# Patient Record
Sex: FEMALE | Race: ASIAN | NOT HISPANIC OR LATINO | Employment: FULL TIME | URBAN - METROPOLITAN AREA
[De-identification: names, ages, dates, MRNs, and addresses within clinical notes are randomized per-mention and may not be internally consistent; named-entity substitution may affect disease eponyms.]

---

## 2017-01-10 ENCOUNTER — TELEPHONE (OUTPATIENT)
Dept: FAMILY MEDICINE | Facility: CLINIC | Age: 47
End: 2017-01-10

## 2017-01-11 ENCOUNTER — OFFICE VISIT (OUTPATIENT)
Dept: FAMILY MEDICINE | Facility: CLINIC | Age: 47
End: 2017-01-11
Payer: COMMERCIAL

## 2017-01-11 VITALS
DIASTOLIC BLOOD PRESSURE: 80 MMHG | RESPIRATION RATE: 16 BRPM | BODY MASS INDEX: 20.66 KG/M2 | HEART RATE: 93 BPM | HEIGHT: 65 IN | TEMPERATURE: 99.5 F | SYSTOLIC BLOOD PRESSURE: 120 MMHG | WEIGHT: 124 LBS | OXYGEN SATURATION: 98 %

## 2017-01-11 DIAGNOSIS — N39.46 MIXED INCONTINENCE: ICD-10-CM

## 2017-01-11 DIAGNOSIS — H65.03 BILATERAL ACUTE SEROUS OTITIS MEDIA, RECURRENCE NOT SPECIFIED: ICD-10-CM

## 2017-01-11 DIAGNOSIS — N30.01 ACUTE CYSTITIS WITH HEMATURIA: Primary | ICD-10-CM

## 2017-01-11 DIAGNOSIS — R30.0 DYSURIA: ICD-10-CM

## 2017-01-11 LAB
ALBUMIN UR-MCNC: ABNORMAL MG/DL
AMORPH CRY #/AREA URNS HPF: ABNORMAL /HPF
APPEARANCE UR: ABNORMAL
BACTERIA #/AREA URNS HPF: ABNORMAL /HPF
BILIRUB UR QL STRIP: NEGATIVE
COLOR UR AUTO: YELLOW
GLUCOSE UR STRIP-MCNC: NEGATIVE MG/DL
HGB UR QL STRIP: ABNORMAL
KETONES UR STRIP-MCNC: NEGATIVE MG/DL
LEUKOCYTE ESTERASE UR QL STRIP: ABNORMAL
NITRATE UR QL: NEGATIVE
NON-SQ EPI CELLS #/AREA URNS LPF: ABNORMAL /LPF
PH UR STRIP: 6 PH (ref 5–7)
RBC #/AREA URNS AUTO: ABNORMAL /HPF (ref 0–2)
SP GR UR STRIP: 1.02 (ref 1–1.03)
URN SPEC COLLECT METH UR: ABNORMAL
UROBILINOGEN UR STRIP-ACNC: 0.2 EU/DL (ref 0.2–1)
WBC #/AREA URNS AUTO: ABNORMAL /HPF (ref 0–2)

## 2017-01-11 PROCEDURE — 81001 URINALYSIS AUTO W/SCOPE: CPT | Performed by: PHYSICIAN ASSISTANT

## 2017-01-11 PROCEDURE — 87086 URINE CULTURE/COLONY COUNT: CPT | Performed by: PHYSICIAN ASSISTANT

## 2017-01-11 PROCEDURE — 87186 SC STD MICRODIL/AGAR DIL: CPT | Performed by: PHYSICIAN ASSISTANT

## 2017-01-11 PROCEDURE — 87088 URINE BACTERIA CULTURE: CPT | Performed by: PHYSICIAN ASSISTANT

## 2017-01-11 PROCEDURE — 99214 OFFICE O/P EST MOD 30 MIN: CPT | Performed by: PHYSICIAN ASSISTANT

## 2017-01-11 RX ORDER — CIPROFLOXACIN 500 MG/1
500 TABLET, FILM COATED ORAL 2 TIMES DAILY
Qty: 14 TABLET | Refills: 0 | Status: SHIPPED | OUTPATIENT
Start: 2017-01-11 | End: 2017-01-24

## 2017-01-11 NOTE — Clinical Note
[facility]  [address]  [facility] [address]  [phone]                                                                                                           Rajwinder Lagos  [address]  [facility] [address]    January 16, 2017      Dear Rajwinder,    The results of your recent tests were reviewed and are enclosed.     -Urine culture is abnormal and grew out bacteria that are sensitive to the antibiotic you have been given.  Complete the medication as prescribed and if you experience new, worsening or persistent symptoms, you should call or return for a recheck.      Thank you for choosing [facility].  We appreciate the opportunity to serve you and look forward to supporting your healthcare needs in the future.    If you have any questions or concerns, please call me or my staff at [phone].      Sincerely,        Gale Jones PA-C

## 2017-01-11 NOTE — MR AVS SNAPSHOT
After Visit Summary   1/11/2017    Rajwinder Lagos    MRN: [id number]           Patient Information     Date Of Birth          1970        Visit Information        Provider Department      1/11/2017 1:10 PM Gale Jones PA-C [facility]        Today's Diagnoses     Acute cystitis with hematuria    -  1     Bilateral acute serous otitis media, recurrence not specified         Mixed incontinence         Dysuria           Care Instructions      Start antibiotic today.    Increase fluids to help flush urinary tract.      Take ibuprofen (600mg every 6 hours with food or water) or tylenol (500mg every 6 hours) for pain.     OTC urinary pain reliever Azo/Uristat can be used to prevent discomfort. It changes the color of the urine (usually to orange or red) and may stain fabric. Do not use for longer than 48 hours since symptoms should have cleared by this time once antibiotics have been started.  If you are still having severe symptoms, you need to follow up with the clinic.    When culture results return, we will call and change your medication if needed.  Follow up immediately if you start to have high fever, severe back pain, nausea or vomiting.  To prevent future infections:  Drink plenty of water.   Do not delay urinating when you feel the need to urinate.   Keep the vaginal area clean. Wipe from front to back after using the toilet. Be sure to gently wash the genital area each time you bathe or shower. However, use soap only on the outside of your vagina. The chemicals in soap may cause additional irritation.   Urinate after intercourse. Never combine anal and vaginal intercourse.   Wear cotton underwear, which allows better air circulation than nylon. Wear pantyhose with cotton crotches.   Avoid tight clothes in the genital area, such as control-top pantyhose and tight jeans. Do not wear a wet bathing suit for long periods of time.     Middle  Ear Pain/ Middle Ear Fluid (Serous Otitis Media)  Home Treatments:    Apply warm wash cloth or a heating pad to the each ear for 10-15 minutes at a time, 3-4 times daily to encourage fluid drainage.    Use decongestants:    pseudoephedrine (Sudafed)- 60 mg every 4-6 hours for up to 2 weeks. Avoid using before bedtime as it may keep you awake.  May cause an increase in blood pressure.    Afrin nasal spray- DO NOT use for longer than 3 days.  This will cause rebound congestion and worsening of symptoms.    Fluticasone (Flonase) nasal spray- now OTC medication that helps with chronic congestion.  Must be used daily and may take up to 2 weeks before improvement is noted.    Nasacort nasal spray-  OTC medication that helps with chronic congestion.  Must be used daily and may take up to 2 weeks before improvement is noted.    Do not try and pop your ears and be careful blowing your nose so you do not put a hole in your ear drum.    Stay well hydrated to thin mucus.    Take ibuprofen (600mg every 6 hours with food or water) or tylenol (500mg every 6 hours) for pain.  Normally, the eustachian tube helps keep the air pressure inside the middle ear the same as the air pressure outside the middle ear. If there is a problem with the eustachian tube, the air pressure inside the middle ear won t be the same as the air pressure outside it. This can cause ear pain, hearing loss, and other symptoms. Most eustachian tube problems are not serious. They usually last only a short time and get better on their own.  Common causes of eustachian tube problems are:  Illnesses or conditions that make the eustachian tubes swollen or inflamed - These include colds, allergies, ear infections, or sinus infections.   Sudden air pressure changes - Sudden air pressure changes can happen when people fly in an airplane, scuba dive, or drive in the mountains.   Growths that block the eustachian tube   Common symptoms of a eustachian tube problem  include:  Ear pain   Feeling pressure or fullness in the ear   Trouble hearing   Ringing in the ear   Feeling dizzy    Treatment depends on what s causing the eustachian tube problem. Depending on your individual situation, your medical provider might treat you with 1 or more of the following:  Nose sprays   Antihistamines - These medicines are usually used to treat allergies. They help stop itching, sneezing, and runny nose symptoms. If your symptoms were triggered by allergies, taking an antihistamine can help stop these symptoms.  Decongestants - These medicines can help with stuffy nose symptoms.   Antibiotic medicines - If you have an infection caused by bacteria, your doctor can treat it with antibiotics.   Call your healthcare provider if you have:    a temperature of 101.5 degrees F (38.6 degrees C) or higher that persists even after you take acetaminophen, aspirin, or ibuprofen    a severe headache or worsening pain around the ear    swelling around the ear    increasing dizziness    worsening of hearing problems    weakness of one side of your face.        Follow-ups after your visit        Additional Services     UROLOGY ADULT REFERRAL       Your provider has referred you to: JO[facility]: [facility] [phone]   [email or web address]    Please be aware that coverage of these services is subject to the terms and limitations of your health insurance plan.  Call member services at your health plan with any benefit or coverage questions.      Please bring the following with you to your appointment:    (1) Any X-Rays, CTs or MRIs which have been performed.  Contact the facility where they were done to arrange for  prior to your scheduled appointment.    (2) List of current medications  (3) This referral request   (4) Any documents/labs given to you for this referral                  Who to contact     If you have questions or need follow up information about today's  "clinic visit or your schedule please contact [facility] directly at [phone].  Normal or non-critical lab and imaging results will be communicated to you by [employer], letter or phone within 4 business days after the clinic has received the results. If you do not hear from us within 7 days, please contact the clinic through [employer] or phone. If you have a critical or abnormal lab result, we will notify you by phone as soon as possible.  Submit refill requests through [employer] or call your pharmacy and they will forward the refill request to us. Please allow 3 business days for your refill to be completed.          Additional Information About Your Visit        [employer] Information     [employer] lets you send messages to your doctor, view your test results, renew your prescriptions, schedule appointments and more. To sign up, go to [email or web address] . Click on \"Log in\" on the left side of the screen, which will take you to the Welcome page. Then click on \"Sign up Now\" on the right side of the page.     You will be asked to enter the access code listed below, as well as some personal information. Please follow the directions to create your username and password.     Your access code is: [email or web address]  Expires: 2017  2:54 PM     Your access code will  in 90 days. If you need help or a new code, please call your [facility] clinic or [phone].        Care EveryWhere ID     This is your Care EveryWhere ID. This could be used by other organizations to access your [facility] medical records  [id number]        Your Vitals Were     Pulse Temperature Respirations Height BMI (Body Mass Index) Pulse Oximetry    93 99.5  F (37.5  C) 16 5' 5.25\" (1.657 m) 20.49 kg/m2 98%       Blood Pressure from Last 3 Encounters:   17 120/80   16 106/64   10/26/16 138/83    Weight from Last 3 Encounters:   17 124 lb (56.246 kg)   16 126 lb 12.8 oz (57.516 kg)   10/26/16 120 lb " (54.432 kg)              We Performed the Following     UA reflex to Microscopic and Culture     Urine Culture Aerobic Bacterial     Urine Microscopic     UROLOGY ADULT REFERRAL          Today's Medication Changes          These changes are accurate as of: 1/11/17  2:54 PM.  If you have any questions, ask your nurse or doctor.               Start taking these medicines.        Dose/Directions    ciprofloxacin 500 MG tablet   Commonly known as:  CIPRO   Used for:  Acute cystitis with hematuria   Started by:  Gale Jones PA-C        Dose:  500 mg   Take 1 tablet (500 mg) by mouth 2 times daily   Quantity:  14 tablet   Refills:  0         Stop taking these medicines if you haven't already. Please contact your care team if you have questions.     nitrofurantoin (macrocrystal-monohydrate) 100 MG capsule   Commonly known as:  MACROBID   Stopped by:  Gale Jones PA-C                Where to get your medicines      These medications were sent to [facility] [address]  [address], [place] [address], [facility] [address]     Phone:  [phone]    - ciprofloxacin 500 MG tablet             Primary Care Provider    Physician No Ref-Primary       No address on file        Thank you!     Thank you for choosing [facility]  for your care. Our goal is always to provide you with excellent care. Hearing back from our patients is one way we can continue to improve our services. Please take a few minutes to complete the written survey that you may receive in the mail after your visit with us. Thank you!             Your Updated Medication List - Protect others around you: Learn how to safely use, store and throw away your medicines at www.disposemymeds.org.          This list is accurate as of: 1/11/17  2:54 PM.  Always use your most recent med list.                   Brand Name Dispense Instructions for use     ciprofloxacin 500 MG tablet    CIPRO    14 tablet    Take 1 tablet (500 mg) by mouth 2 times daily

## 2017-01-11 NOTE — PATIENT INSTRUCTIONS
Start antibiotic today.    Increase fluids to help flush urinary tract.      Take ibuprofen (600mg every 6 hours with food or water) or tylenol (500mg every 6 hours) for pain.     OTC urinary pain reliever Azo/Uristat can be used to prevent discomfort. It changes the color of the urine (usually to orange or red) and may stain fabric. Do not use for longer than 48 hours since symptoms should have cleared by this time once antibiotics have been started.  If you are still having severe symptoms, you need to follow up with the clinic.    When culture results return, we will call and change your medication if needed.  Follow up immediately if you start to have high fever, severe back pain, nausea or vomiting.  To prevent future infections:  Drink plenty of water.   Do not delay urinating when you feel the need to urinate.   Keep the vaginal area clean. Wipe from front to back after using the toilet. Be sure to gently wash the genital area each time you bathe or shower. However, use soap only on the outside of your vagina. The chemicals in soap may cause additional irritation.   Urinate after intercourse. Never combine anal and vaginal intercourse.   Wear cotton underwear, which allows better air circulation than nylon. Wear pantyhose with cotton crotches.   Avoid tight clothes in the genital area, such as control-top pantyhose and tight jeans. Do not wear a wet bathing suit for long periods of time.     Middle Ear Pain/ Middle Ear Fluid (Serous Otitis Media)  Home Treatments:    Apply warm wash cloth or a heating pad to the each ear for 10-15 minutes at a time, 3-4 times daily to encourage fluid drainage.    Use decongestants:    pseudoephedrine (Sudafed)- 60 mg every 4-6 hours for up to 2 weeks. Avoid using before bedtime as it may keep you awake.  May cause an increase in blood pressure.    Afrin nasal spray- DO NOT use for longer than 3 days.  This will cause rebound congestion and worsening of  symptoms.    Fluticasone (Flonase) nasal spray- now OTC medication that helps with chronic congestion.  Must be used daily and may take up to 2 weeks before improvement is noted.    Nasacort nasal spray-  OTC medication that helps with chronic congestion.  Must be used daily and may take up to 2 weeks before improvement is noted.    Do not try and pop your ears and be careful blowing your nose so you do not put a hole in your ear drum.    Stay well hydrated to thin mucus.    Take ibuprofen (600mg every 6 hours with food or water) or tylenol (500mg every 6 hours) for pain.  Normally, the eustachian tube helps keep the air pressure inside the middle ear the same as the air pressure outside the middle ear. If there is a problem with the eustachian tube, the air pressure inside the middle ear won t be the same as the air pressure outside it. This can cause ear pain, hearing loss, and other symptoms. Most eustachian tube problems are not serious. They usually last only a short time and get better on their own.  Common causes of eustachian tube problems are:  Illnesses or conditions that make the eustachian tubes swollen or inflamed   These include colds, allergies, ear infections, or sinus infections.   Sudden air pressure changes   Sudden air pressure changes can happen when people fly in an airplane, scuba dive, or drive in the mountains.   Growths that block the eustachian tube   Common symptoms of a eustachian tube problem include:  Ear pain   Feeling pressure or fullness in the ear   Trouble hearing   Ringing in the ear   Feeling dizzy    Treatment depends on what s causing the eustachian tube problem. Depending on your individual situation, your medical provider might treat you with 1 or more of the following:  Nose sprays   Antihistamines   These medicines are usually used to treat allergies. They help stop itching, sneezing, and runny nose symptoms. If your symptoms were triggered by allergies, taking an  antihistamine can help stop these symptoms.  Decongestants   These medicines can help with stuffy nose symptoms.   Antibiotic medicines   If you have an infection caused by bacteria, your doctor can treat it with antibiotics.   Call your healthcare provider if you have:    a temperature of 101.5 degrees F (38.6 degrees C) or higher that persists even after you take acetaminophen, aspirin, or ibuprofen    a severe headache or worsening pain around the ear    swelling around the ear    increasing dizziness    worsening of hearing problems    weakness of one side of your face.

## 2017-01-11 NOTE — PROGRESS NOTES
SUBJECTIVE:                                                    Rajwinder Lagos is a 46 year old female who presents to clinic today for the following health issues:    Health Maintenance Due   Topic Date Due     LIPID SCREEN Q5 YR FEMALE (SYSTEM ASSIGNED)  12/20/2016     Health Maintenance reviewed at today's visit patient asked to schedule/complete: lipids    URINARY TRACT SYMPTOMS    Duration: 3-4 days    Description dysuria, frequency and chills and body aches    Intensity:  moderate    Accompanying signs and symptoms:  Fever/chills: YES  Flank pain no   Nausea and vomiting: YES- nausea, no emesis  Vaginal symptoms: none  Abdominal/Pelvic Pain: YES- low back pain    History  History of frequent UTI's: no but did have one in October.    History of kidney stones: no   Sexually Active: YES  Possibility of pregnancy: No    Precipitating or alleviating factors: None    Therapies tried and outcome: heat for low back, ibuprofen for headache and body aches with some improvement.       Additional complaints: None    HPI additional notes: Rajwinder presents today with   Chief Complaint   Patient presents with     Chills     Dysuria   History of UTI in Oct, resistant to bactrim, treated with macrobid.  Symptoms had been less painful with urination and then started having body aches, chills over the last couple days and painful headache and neck pain.  Lower back pain started a few days earlier, more on the right side.  Has been getting worse over the last few days.  Heat helps with this pain but it returns when heat is stopping.      CR     0.72   11/12/2014    Also has some left ear pressure and swollen glands in the neck.       ROS:  C: Negative for fever, chills, recent change in weight  ENT: Positive for ear pain and swollen glands.  CV: Negative for chest pain or peripheral edema  GI: Negative for nausea, abdominal pain, heartburn, or change in bowel habits  : POSITIVE for frequency, urgency, and dysuria.  MS:  "Positive for flank pain  P: Negative for changes in mood or affect  ROS otherwise negative.    Chart Review:  History   Smoking status     Never Smoker    Smokeless tobacco     Never Used     Patient Active Problem List   Diagnosis     Fibrous lesion  dysplasia (monostotic), unspecified forearm-Lt nondominant      History reviewed. No pertinent past surgical history.  Problem list, Medication list, Allergies, Medical/Social/Surg hx reviewed in [facility], updated as appropriate.   OBJECTIVE:                                                    /80 mmHg  Pulse 93  Temp(Src) 99.5  F (37.5  C)  Resp 16  Ht 5' 5.25\" (1.657 m)  Wt 124 lb (56.246 kg)  BMI 20.49 kg/m2  SpO2 98%  Body mass index is 20.49 kg/(m^2).  GENERAL: healthy, alert, in no acute distress  HENT: Ear canals normal bilateral. TM dull gray  bulging with serous effusion bilateral.  Nasal mucosa mildly edematous with clear rhinorrhea.  Mouth- mucous membranes moist, no lesions or ulcerations. Pharynx pink. Pharynx pink. and No tonsillary hypertrophy. Uvula midline, no  post-nasal drainage.  Maxillary and frontal sinuses tender to palpation bilaterally.  NECK: Non-tender, no adenopathy.  RESP: lungs clear to auscultation - no rales, no rhonchi, no wheezes  CV: regular rate and rhythm, normal S1 S2.  No peripheral edema.  MS: no gross deformities noted.  Mild left sided CVA tenderness. No paralumbar tenderness.  SKIN: no suspicious lesions, no rashes  PSYCH: Alert and oriented times 3;  Able to articulate logical thoughts. Affect is normal.    Diagnostic test results:   Results for orders placed or performed in visit on 01/11/17 (from the past 24 hour(s))   UA reflex to Microscopic and Culture   Result Value Ref Range    Color Urine Yellow     Appearance Urine Slightly Cloudy     Glucose Urine Negative NEG mg/dL    Bilirubin Urine Negative NEG    Ketones Urine Negative NEG mg/dL    Specific Gravity Urine 1.020 1.003 - 1.035    Blood Urine Small (A) NEG "    pH Urine 6.0 5.0 - 7.0 pH    Protein Albumin Urine Trace (A) NEG mg/dL    Urobilinogen Urine 0.2 0.2 - 1.0 EU/dL    Nitrite Urine Negative NEG    Leukocyte Esterase Urine Large (A) NEG    Source Midstream Urine    Urine Microscopic   Result Value Ref Range    WBC Urine  (A) 0 - 2 /HPF    RBC Urine 2-5 (A) 0 - 2 /HPF    Squamous Epithelial /LPF Urine Many (A) FEW /LPF    Bacteria Urine Few (A) NEG /HPF    Amorphous Crystals Moderate (A) NEG /HPF        ASSESSMENT/PLAN:                                                        ICD-10-CM    1. Acute cystitis with hematuria N30.01 Urine Microscopic     ciprofloxacin (CIPRO) 500 MG tablet   2. Dysuria R30.0 UA reflex to Microscopic and Culture   3. Bilateral acute serous otitis media, recurrence not specified H65.03      Will call if urine culture recommends antibiotic change.  Okay to leave voicemail on mobile phone.    Discussed OTC medications for headache, congestion and serous otitis media.  I do not believe they are related to her bladder symptoms.      Pt also discussed increased urinary frequency with incontinence which has been worsening since the birth of her last child 7 years ago.  Has not been evaluated for this. Needs to urinate about every 30 minutes during the day.  Urology referral provided for follow up.      Please see patient instructions for treatment details.    Follow up in 7-10 days if not improving as anticipated.    Gale Jones PA-C  [facility]

## 2017-01-11 NOTE — NURSING NOTE
"Chief Complaint   Patient presents with     Chills     Dysuria       Initial /80 mmHg  Pulse 93  Temp(Src) 99.5  F (37.5  C)  Resp 16  Ht 5' 5.25\" (1.657 m)  Wt 124 lb (56.246 kg)  BMI 20.49 kg/m2  SpO2 98% Estimated body mass index is 20.49 kg/(m^2) as calculated from the following:    Height as of this encounter: 5' 5.25\" (1.657 m).    Weight as of this encounter: 124 lb (56.246 kg).  BP completed using cuff size: regular  S Luis Alfredo CMA      "

## 2017-01-13 LAB
BACTERIA SPEC CULT: ABNORMAL
MICRO REPORT STATUS: ABNORMAL
MICROORGANISM SPEC CULT: ABNORMAL
SPECIMEN SOURCE: ABNORMAL

## 2017-01-14 ENCOUNTER — TELEPHONE (OUTPATIENT)
Dept: NURSING | Facility: CLINIC | Age: 47
End: 2017-01-14

## 2017-01-14 NOTE — TELEPHONE ENCOUNTER
Call Type: Triage Call    Presenting Problem: Caller states that she in taking an antibiotic  for a bladder infection. She says that she also is having some sinus  pressure and headache. She says provider   told her to take daquil  and she has, but doesn't think that it is helping, Per EPIc advised  can also try nasal spray and staying hydrated.  Advised to call  provider if not fe eling better by next week.  Triage Note:  Guideline Title: Face Pain or Swelling  Recommended Disposition: Provide Home/Self Care  Original Inclination: Wanted to speak with a nurse  Override Disposition:  Intended Action: Follow advice given  Physician Contacted: No  Pressure/pain above or below eyes, near ears over sinuses (may also be described  as fullness, worsens when bending forward, teeth or eye pain) ?  YES  Painful OR itchy rash on face ? NO  Severe breathing problems ? NO  Known herpes zoster or undiagnosed blister-like rash on or near eye or on tip of  nose ? NO  Toothache/tooth pain (not caused by injury) with some localized swelling of face ?  NO  Outbreak of pimples/papules, pustules or cysts ? NO  Rapid onset of generalized swelling of face or neck (other than glands or lymph  nodes) ? NO  Severe pain that continues; interferes with ability to carry out usual activities  or sleep AND is not responding to 24 hours of home care ? NO  Signs/symptoms of anaphylaxis ? NO  Blisters or sores on or near eye(s) ? NO  Unable to open or close mouth fully ? NO  Tissue surrounding eye is red, swollen and tender ? NO  Worsening redness, swelling AND tenderness of tissue around eyes associated with  restricted or painful eye movements, bulging eye, or decreased vision related to  swelling ? NO  Pain in ear or in jaw joint (TMJ) with movement, may also hear clicking, popping  or grating sound; temple area may be tender to touch ? NO  Unexplained localized swelling (not related to trauma, bites/stings, or known  allergy) AND without any  symptoms of anaphylaxis ? NO  Any other cardiac signs/symptoms for more than 5 minutes, now or within last hour.  Pain is NOT associated with taking a deep breath or a productive cough, movement,  or touch to a localized area on the chest or upper body. ? NO  New tenderness over temporal area in individuals age 40 years or older ? NO  Being treated by a provider for a secondary infection AND no improvement in  symptoms, symptoms have worsened OR has new symptoms after following treatment  plan for the time specified by provider. ? NO  Pregnant person with temperature of 100 F (37.7 C) or greater OR any temperature  elevation for 3 days. ? NO  Eye pain or vision change ? NO  Any temperature elevation in an immunocompromised individual OR frail elderly with  signs of dehydration ? NO  Physician Instructions:  Care Advice: Use a cool mist humidifier to moisten air.  Be sure to clean  according to 's instructions.  Consider use of a saline nasal spray per package directions to help relieve  nasal congestion.  Consider nonprescription decongestant (Sudafed, Drixoral) for relief of  symptoms after checking with a provider, especially if there is a history  of hypertension, hyperthyroidism, heart disease, diabetes, glaucoma,  urinary retention caused by prostatic hypertrophy.  Drink more fluids when not feeling well unless you have been told to limit  fluid intake because of a health condition. The body's total water needs  are met through drinking water and other beverages and the foods we eat.  Urine will be very light yellow color when your body is well hydrated.  Apply local moist heat (such as a warm, wet wash cloth or small towel  covered with plastic wrap) to the area for 15-20 minutes every 2-3 hours  while awake.  Analgesic/Antipyretic Advice - Acetaminophen: Consider acetaminophen as  directed on label or by pharmacist/provider for pain or fever. PRECAUTIONS:  - Use if there is no history of liver  disease, alcoholism, or intake of  three or more alcohol drinks per day - Only if approved by provider during  pregnancy or when breastfeeding - Do not exceed recommended dose or  frequency. Do not take more than 3000 milligrams (mg) in 24 hours. Do not  take this medicine for more than 10 days unless recommended by your  provider. - During pregnancy, acetaminophen should not be taken more than 3  consecutive days without telling provider - To make sure you don't take too  much, check other medicines you take to see if they also contain  acetaminophen.  See a provider within 72 hours if your symptoms continue for 10 days or  more.  See a provider within 8 hours if you develop a temperature of 101.5 F (38.6  C) or higher  See a provider within 8 hours if you develop redness, swelling and  tenderness above or below your eyes, near ears, or over sinuses

## 2017-01-23 ENCOUNTER — TELEPHONE (OUTPATIENT)
Dept: FAMILY MEDICINE | Facility: CLINIC | Age: 47
End: 2017-01-23

## 2017-01-23 NOTE — TELEPHONE ENCOUNTER
Reason for Call:  Other received a letter from Kylie and has a question about her lab results    Detailed comments: none    Phone Number Patient can be reached at: Home number on file [phone] (home)    Best Time: any     Can we leave a detailed message on this number? YES    Call taken on 1/23/2017 at 10:16 AM by RAFIA YANG

## 2017-01-23 NOTE — TELEPHONE ENCOUNTER
Patient was called, completed CIPRO, all symptoms went away except a 5-6/10 cramp in the LLQ - one side only (not as severe at menstruation).   She has an appt with OB on February 21st for a physical. This pain has been more often in the last 3 days.   Does she need to follow-up with Karen Espinal too?   Will route to Providers team 1

## 2017-01-24 ENCOUNTER — OFFICE VISIT (OUTPATIENT)
Dept: FAMILY MEDICINE | Facility: CLINIC | Age: 47
End: 2017-01-24
Payer: COMMERCIAL

## 2017-01-24 ENCOUNTER — RADIANT APPOINTMENT (OUTPATIENT)
Dept: GENERAL RADIOLOGY | Facility: CLINIC | Age: 47
End: 2017-01-24
Attending: FAMILY MEDICINE
Payer: COMMERCIAL

## 2017-01-24 VITALS
RESPIRATION RATE: 18 BRPM | SYSTOLIC BLOOD PRESSURE: 114 MMHG | HEART RATE: 70 BPM | WEIGHT: 125 LBS | DIASTOLIC BLOOD PRESSURE: 70 MMHG | HEIGHT: 66 IN | BODY MASS INDEX: 20.09 KG/M2 | TEMPERATURE: 98.6 F

## 2017-01-24 DIAGNOSIS — R10.32 LLQ ABDOMINAL PAIN: ICD-10-CM

## 2017-01-24 DIAGNOSIS — R10.32 LLQ ABDOMINAL PAIN: Primary | ICD-10-CM

## 2017-01-24 DIAGNOSIS — K59.00 CONSTIPATION, UNSPECIFIED CONSTIPATION TYPE: ICD-10-CM

## 2017-01-24 LAB
ALBUMIN UR-MCNC: NEGATIVE MG/DL
APPEARANCE UR: CLEAR
BASOPHILS # BLD AUTO: 0 10E9/L (ref 0–0.2)
BASOPHILS NFR BLD AUTO: 0.2 %
BILIRUB UR QL STRIP: NEGATIVE
COLOR UR AUTO: YELLOW
DIFFERENTIAL METHOD BLD: ABNORMAL
EOSINOPHIL # BLD AUTO: 0 10E9/L (ref 0–0.7)
EOSINOPHIL NFR BLD AUTO: 0.7 %
ERYTHROCYTE [DISTWIDTH] IN BLOOD BY AUTOMATED COUNT: 12.8 % (ref 10–15)
GLUCOSE UR STRIP-MCNC: NEGATIVE MG/DL
HCT VFR BLD AUTO: 35 % (ref 35–47)
HGB BLD-MCNC: 11.6 G/DL (ref 11.7–15.7)
HGB UR QL STRIP: NEGATIVE
KETONES UR STRIP-MCNC: NEGATIVE MG/DL
LEUKOCYTE ESTERASE UR QL STRIP: NEGATIVE
LYMPHOCYTES # BLD AUTO: 1.5 10E9/L (ref 0.8–5.3)
LYMPHOCYTES NFR BLD AUTO: 25.8 %
MCH RBC QN AUTO: 28.6 PG (ref 26.5–33)
MCHC RBC AUTO-ENTMCNC: 33.1 G/DL (ref 31.5–36.5)
MCV RBC AUTO: 86 FL (ref 78–100)
MONOCYTES # BLD AUTO: 0.4 10E9/L (ref 0–1.3)
MONOCYTES NFR BLD AUTO: 7.8 %
NEUTROPHILS # BLD AUTO: 3.7 10E9/L (ref 1.6–8.3)
NEUTROPHILS NFR BLD AUTO: 65.5 %
NITRATE UR QL: NEGATIVE
NON-SQ EPI CELLS #/AREA URNS LPF: ABNORMAL /LPF
PH UR STRIP: 6 PH (ref 5–7)
PLATELET # BLD AUTO: 334 10E9/L (ref 150–450)
RBC # BLD AUTO: 4.05 10E12/L (ref 3.8–5.2)
RBC #/AREA URNS AUTO: ABNORMAL /HPF (ref 0–2)
SP GR UR STRIP: 1.02 (ref 1–1.03)
URN SPEC COLLECT METH UR: ABNORMAL
UROBILINOGEN UR STRIP-ACNC: 0.2 EU/DL (ref 0.2–1)
WBC # BLD AUTO: 5.6 10E9/L (ref 4–11)
WBC #/AREA URNS AUTO: ABNORMAL /HPF (ref 0–2)

## 2017-01-24 PROCEDURE — 85025 COMPLETE CBC W/AUTO DIFF WBC: CPT | Performed by: FAMILY MEDICINE

## 2017-01-24 PROCEDURE — 74010 XR KUB: CPT | Mod: 52

## 2017-01-24 PROCEDURE — 81001 URINALYSIS AUTO W/SCOPE: CPT | Performed by: FAMILY MEDICINE

## 2017-01-24 PROCEDURE — 99213 OFFICE O/P EST LOW 20 MIN: CPT | Performed by: FAMILY MEDICINE

## 2017-01-24 PROCEDURE — 36415 COLL VENOUS BLD VENIPUNCTURE: CPT | Performed by: FAMILY MEDICINE

## 2017-01-24 NOTE — PROGRESS NOTES
"  SUBJECTIVE:                                                    Rajwinder Lagos is a 46 year old female who presents to clinic today for the following health issues:      Abdominal Pain      Duration:2 weeks    Description (location/character/radiation): LLQ- Pain is improving and is intermittent.       Associated flank pain: None    Intensity:  mild  Accompanying signs and symptoms:        Fever/Chills: no        Gas/Bloating: YES- bloating and pressure        Nausea/vomitting: no        Diarrhea: no        Dysuria or Hematuria: no     History (previous similar pain/trauma/previous testing): treated 1/11/17 for uti- dysuria has resolved    Precipitating or alleviating factors:       Pain worse with eating/BM/urination: no       Pain relieved by BM: Yes somewhat.  Stools are moderately firm and if anything the patient does tend towards constipation.    Therapies tried and outcome: None    LMP:  not applicable           Problem list and histories reviewed & adjusted, as indicated.  Additional history: as documented    Problem list, Medication list, Allergies, and Medical/Social/Surgical histories reviewed in EPIC and updated as appropriate.    ROS:  Constitutional, HEENT, cardiovascular, pulmonary, gi and gu systems are negative, except as otherwise noted.    OBJECTIVE:                                                    /70 mmHg  Pulse 70  Temp(Src) 98.6  F (37  C) (Tympanic)  Resp 18  Ht 5' 5.75\" (1.67 m)  Wt 125 lb (56.7 kg)  BMI 20.33 kg/m2  LMP 12/31/2016  Body mass index is 20.33 kg/(m^2).  GENERAL APPEARANCE: healthy, alert and no distress  ABDOMEN: bowel sounds normal, no palpable masses and there is mild discomfort to palpation in the left lower quadrant of the abdomen.  There is no rebound and there is no guarding.   (female): There is no flank or CVA tenderness.    Diagnostic test results:  Results for orders placed or performed in visit on 01/24/17 (from the past 24 hour(s))   UA " with Microscopic reflex to Culture   Result Value Ref Range    Color Urine Yellow     Appearance Urine Clear     Glucose Urine Negative NEG mg/dL    Bilirubin Urine Negative NEG    Ketones Urine Negative NEG mg/dL    Specific Gravity Urine 1.020 1.003 - 1.035    pH Urine 6.0 5.0 - 7.0 pH    Protein Albumin Urine Negative NEG mg/dL    Urobilinogen Urine 0.2 0.2 - 1.0 EU/dL    Nitrite Urine Negative NEG    Blood Urine Negative NEG    Leukocyte Esterase Urine Negative NEG    Source Midstream Urine     WBC Urine O - 2 0 - 2 /HPF    RBC Urine O - 2 0 - 2 /HPF    Squamous Epithelial /LPF Urine Many (A) FEW /LPF   CBC with platelets differential   Result Value Ref Range    WBC 5.6 4.0 - 11.0 10e9/L    RBC Count 4.05 3.8 - 5.2 10e12/L    Hemoglobin 11.6 (L) 11.7 - 15.7 g/dL    Hematocrit 35.0 35.0 - 47.0 %    MCV 86 78 - 100 fl    MCH 28.6 26.5 - 33.0 pg    MCHC 33.1 31.5 - 36.5 g/dL    RDW 12.8 10.0 - 15.0 %    Platelet Count 334 150 - 450 10e9/L    Diff Method Automated Method     % Neutrophils 65.5 %    % Lymphocytes 25.8 %    % Monocytes 7.8 %    % Eosinophils 0.7 %    % Basophils 0.2 %    Absolute Neutrophil 3.7 1.6 - 8.3 10e9/L    Absolute Lymphocytes 1.5 0.8 - 5.3 10e9/L    Absolute Monocytes 0.4 0.0 - 1.3 10e9/L    Absolute Eosinophils 0.0 0.0 - 0.7 10e9/L    Absolute Basophils 0.0 0.0 - 0.2 10e9/L     Xray - abdominal x-ray is nondiagnostic by my reading.       ASSESSMENT/PLAN:                                                        ICD-10-CM    1. LLQ abdominal pain R10.32 UA with Microscopic reflex to Culture     CBC with platelets differential     XR KUB     methylcellulose, laxative, (CITRUCEL) POWD   2. Constipation, unspecified constipation type K59.00        Patient Instructions   Patient will start Citrucel powder 1 tablespoon in 8 ounces of water daily to start.  She may increase this every 4-5 days until her stools become soft, regular and with no straining.  She hasn't appointment mid-February for her GYN  visit.  She will check back with us if this does not resolve her discomfort issues.  Urinalysis and CBC were both normal today.  If official reading of her abdominal x-ray comes back different than mine I will contact the patient.        Yao Flores MD  [facility]

## 2017-01-24 NOTE — PATIENT INSTRUCTIONS
Patient will start Citrucel powder 1 tablespoon in 8 ounces of water daily to start.  She may increase this every 4-5 days until her stools become soft, regular and with no straining.  She hasn't appointment mid-February for her GYN visit.  She will check back with us if this does not resolve her discomfort issues.  Urinalysis and CBC were both normal today.  If official reading of her abdominal x-ray comes back different than mine I will contact the patient.

## 2017-01-24 NOTE — NURSING NOTE
"Chief Complaint   Patient presents with     Abdominal Pain       Initial /70 mmHg  Pulse 70  Temp(Src) 98.6  F (37  C) (Tympanic)  Resp 18  Ht 5' 5.75\" (1.67 m)  Wt 125 lb (56.7 kg)  BMI 20.33 kg/m2 Estimated body mass index is 20.33 kg/(m^2) as calculated from the following:    Height as of this encounter: 5' 5.75\" (1.67 m).    Weight as of this encounter: 125 lb (56.7 kg).  BP completed using cuff size: phu Molina CMA      "

## 2017-01-24 NOTE — MR AVS SNAPSHOT
"              After Visit Summary   2017    Rajwinder Lagos    MRN: [id number]           Patient Information     Date Of Birth          1970        Visit Information        Provider Department      2017 11:30 AM Yao Flores MD [facility]        Today's Diagnoses     LLQ abdominal pain    -  1     Constipation, unspecified constipation type            Follow-ups after your visit        Who to contact     If you have questions or need follow up information about today's clinic visit or your schedule please contact [facility] directly at [phone].  Normal or non-critical lab and imaging results will be communicated to you by MyChart, letter or phone within 4 business days after the clinic has received the results. If you do not hear from us within 7 days, please contact the clinic through [employer] or phone. If you have a critical or abnormal lab result, we will notify you by phone as soon as possible.  Submit refill requests through [employer] or call your pharmacy and they will forward the refill request to us. Please allow 3 business days for your refill to be completed.          Additional Information About Your Visit        MyChart Information     [employer] lets you send messages to your doctor, view your test results, renew your prescriptions, schedule appointments and more. To sign up, go to [email or web address] . Click on \"Log in\" on the left side of the screen, which will take you to the Welcome page. Then click on \"Sign up Now\" on the right side of the page.     You will be asked to enter the access code listed below, as well as some personal information. Please follow the directions to create your username and password.     Your access code is: [email or web address]  Expires: 2017  2:54 PM     Your access code will  in 90 days. If you need help or a new code, please call your [facility] or [phone].      " "  Care EveryWhere ID     This is your Care EveryWhere ID. This could be used by other organizations to access your [facility] medical records  [id number]        Your Vitals Were     Pulse Temperature Respirations Height BMI (Body Mass Index) Last Period    70 98.6  F (37  C) (Tympanic) 18 5' 5.75\" (1.67 m) 20.33 kg/m2 12/31/2016       Blood Pressure from Last 3 Encounters:   01/24/17 114/70   01/11/17 120/80   11/02/16 106/64    Weight from Last 3 Encounters:   01/24/17 125 lb (56.7 kg)   01/11/17 124 lb (56.246 kg)   11/02/16 126 lb 12.8 oz (57.516 kg)              We Performed the Following     CBC with platelets differential     UA with Microscopic reflex to Culture          Today's Medication Changes          These changes are accurate as of: 1/24/17 12:29 PM.  If you have any questions, ask your nurse or doctor.               Start taking these medicines.        Dose/Directions    methylcellulose (laxative) Powd   Commonly known as:  CITRUCEL   Used for:  LLQ abdominal pain   Started by:  Yao Flores MD        One tablespoon of powder in water daily. May increase every 4-5 days as needed.   Refills:  0            Where to get your medicines      Some of these will need a paper prescription and others can be bought over the counter.  Ask your nurse if you have questions.     You don't need a prescription for these medications    - methylcellulose (laxative) Powd             Primary Care Provider    Physician No Ref-Primary       No address on file        Thank you!     Thank you for choosing [facility]  for your care. Our goal is always to provide you with excellent care. Hearing back from our patients is one way we can continue to improve our services. Please take a few minutes to complete the written survey that you may receive in the mail after your visit with us. Thank you!             Your Updated Medication List - Protect others around you: Learn how to safely use, " store and throw away your medicines at www.disposemymeds.org.          This list is accurate as of: 1/24/17 12:29 PM.  Always use your most recent med list.                   Brand Name Dispense Instructions for use    methylcellulose (laxative) Powd    CITRUCEL     One tablespoon of powder in water daily. May increase every 4-5 days as needed.

## 2017-03-24 ENCOUNTER — OFFICE VISIT (OUTPATIENT)
Dept: FAMILY MEDICINE | Facility: CLINIC | Age: 47
End: 2017-03-24
Payer: COMMERCIAL

## 2017-03-24 VITALS
WEIGHT: 122 LBS | HEART RATE: 117 BPM | HEIGHT: 66 IN | OXYGEN SATURATION: 99 % | TEMPERATURE: 101.7 F | RESPIRATION RATE: 16 BRPM | SYSTOLIC BLOOD PRESSURE: 122 MMHG | BODY MASS INDEX: 19.61 KG/M2 | DIASTOLIC BLOOD PRESSURE: 80 MMHG

## 2017-03-24 DIAGNOSIS — R50.9 FEVER, UNSPECIFIED FEVER CAUSE: ICD-10-CM

## 2017-03-24 DIAGNOSIS — R07.0 THROAT PAIN: ICD-10-CM

## 2017-03-24 DIAGNOSIS — J11.1 INFLUENZA WITH RESPIRATORY MANIFESTATION OTHER THAN PNEUMONIA: Primary | ICD-10-CM

## 2017-03-24 LAB
DEPRECATED S PYO AG THROAT QL EIA: NORMAL
FLUAV+FLUBV AG SPEC QL: ABNORMAL
FLUAV+FLUBV AG SPEC QL: ABNORMAL
MICRO REPORT STATUS: NORMAL
SPECIMEN SOURCE: ABNORMAL
SPECIMEN SOURCE: NORMAL

## 2017-03-24 PROCEDURE — 99213 OFFICE O/P EST LOW 20 MIN: CPT | Performed by: PHYSICIAN ASSISTANT

## 2017-03-24 PROCEDURE — 87081 CULTURE SCREEN ONLY: CPT | Performed by: PHYSICIAN ASSISTANT

## 2017-03-24 PROCEDURE — 87880 STREP A ASSAY W/OPTIC: CPT | Performed by: PHYSICIAN ASSISTANT

## 2017-03-24 RX ORDER — OSELTAMIVIR PHOSPHATE 75 MG/1
75 CAPSULE ORAL 2 TIMES DAILY
Qty: 10 CAPSULE | Refills: 0 | Status: SHIPPED | OUTPATIENT
Start: 2017-03-24 | End: 2020-03-12

## 2017-03-24 NOTE — PATIENT INSTRUCTIONS
Influenza  Influenza ( the flu ) is an infection that affects your respiratory tract (the mouth, nose, and lungs, and the passages between them). Unlike a cold, the flu can make you very ill. And it can lead to pneumonia, a serious lung infection. For some people, especially older adults, young children, and people with certain chronic conditions, the flu can have serious complications and even be fatal.  What Are the Risk Factors for the Flu?     Viruses that cause influenza spread through the air in droplets when someone who has the flu coughs, sneezes, laughs, or talks.   Anyone can get the flu. But you re more likely to become infected if you:    Have a weakened immune system.    Work in a health care setting where you may be exposed to flu germs.    Live or work with someone who has the flu.    Haven t received an annual flu shot.  How Does the Flu Spread?  The flu is caused by viruses. The viruses spread through the air in droplets when someone who has the flu coughs, sneezes, laughs, or talks. You can become infected when you inhale these viruses directly. You can also become infected when you touch a surface on which the droplets have landed and then transfer the germs to your eyes, nose, or mouth. Touching used tissues, or sharing utensils, drinking glasses, or a toothbrush with an infected person can expose you to flu viruses, too.  What Are the Symptoms of the Flu?  Flu symptoms tend to come on quickly and may last a few days to a few weeks. They include:    Fever usually higher than 101 F  (38.3 C) and chills    Sore throat and headache    Dry cough    Runny nose    Tiredness and weakness    Muscle aches  Factors That Can Make Flu Worse  For some people, the flu can be very serious. The risk of complications is greater for:    Children under age 5.    Adults 65 years of age and older.    People with a chronic illness, such as diabetes or heart, kidney, or lung disease.    People who live in a nursing  home or long-term care facility.   How Is the Flu Treated?  Influenza usually improves after 7 days or so. In some cases, your health care provider may prescribe an antiviral medication. This may help you get well sooner. For the medication to help, you need to take it as soon as possible (ideally within 48 hours) after your symptoms start. If you develop pneumonia or other serious illness, hospital care may be needed.  Easing Flu Symptoms    Drink lots of fluids such as water, juice, and warm soup. A good rule is to drink enough so that you urinate your normal amount.    Get plenty of rest.    Ask your health care provider what to take for fever and pain.    Call your provider if your fever rises over 101 F (38.3 C) or you become dizzy, lightheaded, or short of breath.  Taking Steps to Protect Others    Wash your hands often, especially after coughing or sneezing. Or, clean your hands with an alcohol-based hand  containing at least 60 percent alcohol.    Cough or sneeze into a tissue. Then throw the tissue away and wash your hands. If you don t have a tissue, cough and sneeze into the crook of your elbow.    Stay home until at least 24 hours after you no longer have a fever or chills. Be sure the fever isn t being hidden by fever-reducing medication.    Don t share food, utensils, drinking glasses, or a toothbrush with others.    Ask your health care provider if others in your household should receive antiviral medication to help them avoid infection.  How Can the Flu Be Prevented?    One of the best ways to avoid the flu is to get a flu vaccination each year. Viruses that cause the flu change from year to year. For that reason, doctors recommend getting the flu vaccine each year, as soon as it's available in your area. The vaccine may be given as a shot or as a nasal spray. Your health care provider can tell you which vaccine is right for you.    Wash your hands often. Frequent handwashing is a proven way  to help prevent infection.    Carry an alcohol-based hand gel containing at least 60 percent alcohol. Use it when you don t have access to soap and water. Then wash your hands as soon as you can.    Avoid touching your eyes, nose, and mouth.    At home and work, clean phones, computer keyboards, and toys often with disinfectant wipes.    If possible, avoid close contact with others who have the flu or symptoms of the flu.  Handwashing Tips  Handwashing is one of the best ways to prevent many common infections. If you re caring for or visiting someone with the flu, wash your hands each time you enter and leave the room. Follow these steps:    Use warm water and plenty of soap. Rub your hands together well.    Clean the whole hand, under your nails, between your fingers, and up the wrists.    Wash for at least 15 seconds.    Rinse, letting the water run down your fingers, not up your wrists.    Dry your hands well. Use a paper towel to turn off the faucet and open the door.  Using Alcohol-Based Hand   Alcohol-based hand  are also a good choice. Use them when you don t have access to soap and water. Follow these steps:    Squeeze about a tablespoon of gel into the palm of one hand.    Rub your hands together briskly, cleaning the backs of your hands, the palms, between your fingers, and up the wrists.    Rub until the gel is gone and your hands are completely dry.  Preventing Influenza in Healthcare Settings  The flu is a special concern for people in hospitals and long-term care facilities. To help prevent the spread of flu, many hospitals and nursing homes take these steps:    Health care providers wash their hands or use an alcohol-based hand  before and after treating each patient.    People with the flu have private rooms and bathrooms or share a room with someone with the same infection.    High-risk patients who don t have the flu are encouraged to get the flu and pneumonia  vaccines.    All health care workers are encouraged or required to get flu shots.        [id number] [employer]. [address] [place] [address], [place] [address]. All rights reserved. This information is not intended as a substitute for professional medical care. Always follow your healthcare professional's instructions.

## 2017-03-24 NOTE — NURSING NOTE
"Chief Complaint   Patient presents with     URI     Sinus congestion, sore throat, chills, cough, Facial pain.       Initial /80 (BP Location: Right arm, Patient Position: Chair, Cuff Size: Adult Regular)  Pulse 117  Temp 101.7  F (38.7  C) (Tympanic)  Resp 16  Ht 5' 5.75\" (1.67 m)  Wt 122 lb (55.3 kg)  LMP 02/23/2017  SpO2 99%  Breastfeeding? No  BMI 19.84 kg/m2 Estimated body mass index is 19.84 kg/(m^2) as calculated from the following:    Height as of this encounter: 5' 5.75\" (1.67 m).    Weight as of this encounter: 122 lb (55.3 kg).  Medication Reconciliation: complete     Zora Mei LPN  "

## 2017-03-24 NOTE — PROGRESS NOTES
"  SUBJECTIVE:                                                    Rajwinder Lagos is a 46 year old female who presents to clinic today for the following health issues:        RESPIRATORY SYMPTOMS      Duration: Since Wednesday    Description  nasal congestion, facial pain/pressure, cough, fever, chills, headache and myalgias    Severity: severe    Accompanying signs and symptoms: See above    History (predisposing factors):  none    Precipitating or alleviating factors: None    Therapies tried and outcome:  OTC NSAID     As above, sore throat, sinus pressure and persisting fever with a dry cough that is intermittent.        ROS:  C: POSITIVE for fever and chills.  Skin: Negative for worrisome rashes or lesions  Resp: Positive for non-productive cough without shortness of breath or wheezing  CV: Negative for chest pain or peripheral edema  GI: Negative for nausea, abdominal pain, heartburn, or change in bowel habits  MS: POSITIVE for generalized fatigue and malaise.      PFSH: no known hx of respiratory illness, not a smoker.   No known ill contacts. She brought her niece to the doctor recently     Patient Active Problem List   Diagnosis     Fibrous lesion  dysplasia (monostotic), unspecified forearm-Lt nondominant      Mixed incontinence     Constipation, unspecified constipation type     Current Outpatient Prescriptions   Medication     oseltamivir (TAMIFLU) 75 MG capsule     methylcellulose, laxative, (CITRUCEL) POWD     No current facility-administered medications for this visit.        OBJECTIVE:                                                    /80 (BP Location: Right arm, Patient Position: Chair, Cuff Size: Adult Regular)  Pulse 117  Temp 101.7  F (38.7  C) (Tympanic)  Resp 16  Ht 5' 5.75\" (1.67 m)  Wt 122 lb (55.3 kg)  LMP 02/23/2017  SpO2 99%  Breastfeeding? No  BMI 19.84 kg/m2  Body mass index is 19.84 kg/(m^2).  GENERAL: healthy, alert, in no acute distress  EYES: Grossly normal to " inspection, EOMI, PERRL  HENT: Ear canals normal bilateral. TM pearly gray without effusion bilaterally.  Nasal mucosa mildly edematous with clear rhinorrhea.  Mouth- mucous membranes moist, no lesions or ulcerations. Pharynx erythematous without petechia. and 2+ tonsillary hypertrophy. Uvula midline, clear post-nasal drainage.  Maxillary and frontal sinuses tender to palpation bilaterally.  NECK: Non-tender, no adenopathy.  RESP: lungs clear to auscultation - no rales, no rhonchi, no wheezes  CV: regular rate and rhythm, normal S1 S2.  No peripheral edema.  SKIN: no suspicious lesions, no rashes  PSYCH: Alert and oriented times 3;  Able to articulate logical thoughts. Affect is normal.    Diagnostic test results:   Results for orders placed or performed in visit on 03/24/17 (from the past 24 hour(s))   Strep, Rapid Screen   Result Value Ref Range    Specimen Description Throat     Rapid Strep A Screen       NEGATIVE: No Group A streptococcal antigen detected by immunoassay, await   culture report.      Micro Report Status FINAL 03/24/2017         ASSESSMENT/PLAN:                                                        ICD-10-CM    1. Influenza with respiratory manifestation other than pneumonia J11.1 oseltamivir (TAMIFLU) 75 MG capsule   2. Throat pain R07.0 Strep, Rapid Screen     Beta strep group A culture   3. Fever, unspecified fever cause R50.9 Influenza A/B antigen     Treatment as below; suggested contacting her childrens pediatrician and seeing if they recommend prophylactic treatment for her children for influenza. Discussed other care instructions below.     Patient Instructions       Influenza  Influenza ( the flu ) is an infection that affects your respiratory tract (the mouth, nose, and lungs, and the passages between them). Unlike a cold, the flu can make you very ill. And it can lead to pneumonia, a serious lung infection. For some people, especially older adults, young children, and people with  certain chronic conditions, the flu can have serious complications and even be fatal.  What Are the Risk Factors for the Flu?     Viruses that cause influenza spread through the air in droplets when someone who has the flu coughs, sneezes, laughs, or talks.   Anyone can get the flu. But you re more likely to become infected if you:    Have a weakened immune system.    Work in a health care setting where you may be exposed to flu germs.    Live or work with someone who has the flu.    Haven t received an annual flu shot.  How Does the Flu Spread?  The flu is caused by viruses. The viruses spread through the air in droplets when someone who has the flu coughs, sneezes, laughs, or talks. You can become infected when you inhale these viruses directly. You can also become infected when you touch a surface on which the droplets have landed and then transfer the germs to your eyes, nose, or mouth. Touching used tissues, or sharing utensils, drinking glasses, or a toothbrush with an infected person can expose you to flu viruses, too.  What Are the Symptoms of the Flu?  Flu symptoms tend to come on quickly and may last a few days to a few weeks. They include:    Fever usually higher than 101 F  (38.3 C) and chills    Sore throat and headache    Dry cough    Runny nose    Tiredness and weakness    Muscle aches  Factors That Can Make Flu Worse  For some people, the flu can be very serious. The risk of complications is greater for:    Children under age 5.    Adults 65 years of age and older.    People with a chronic illness, such as diabetes or heart, kidney, or lung disease.    People who live in a nursing home or long-term care facility.   How Is the Flu Treated?  Influenza usually improves after 7 days or so. In some cases, your health care provider may prescribe an antiviral medication. This may help you get well sooner. For the medication to help, you need to take it as soon as possible (ideally within 48 hours) after  your symptoms start. If you develop pneumonia or other serious illness, hospital care may be needed.  Easing Flu Symptoms    Drink lots of fluids such as water, juice, and warm soup. A good rule is to drink enough so that you urinate your normal amount.    Get plenty of rest.    Ask your health care provider what to take for fever and pain.    Call your provider if your fever rises over 101 F (38.3 C) or you become dizzy, lightheaded, or short of breath.  Taking Steps to Protect Others    Wash your hands often, especially after coughing or sneezing. Or, clean your hands with an alcohol-based hand  containing at least 60 percent alcohol.    Cough or sneeze into a tissue. Then throw the tissue away and wash your hands. If you don t have a tissue, cough and sneeze into the crook of your elbow.    Stay home until at least 24 hours after you no longer have a fever or chills. Be sure the fever isn t being hidden by fever-reducing medication.    Don t share food, utensils, drinking glasses, or a toothbrush with others.    Ask your health care provider if others in your household should receive antiviral medication to help them avoid infection.  How Can the Flu Be Prevented?    One of the best ways to avoid the flu is to get a flu vaccination each year. Viruses that cause the flu change from year to year. For that reason, doctors recommend getting the flu vaccine each year, as soon as it's available in your area. The vaccine may be given as a shot or as a nasal spray. Your health care provider can tell you which vaccine is right for you.    Wash your hands often. Frequent handwashing is a proven way to help prevent infection.    Carry an alcohol-based hand gel containing at least 60 percent alcohol. Use it when you don t have access to soap and water. Then wash your hands as soon as you can.    Avoid touching your eyes, nose, and mouth.    At home and work, clean phones, computer keyboards, and toys often with  disinfectant wipes.    If possible, avoid close contact with others who have the flu or symptoms of the flu.  Handwashing Tips  Handwashing is one of the best ways to prevent many common infections. If you re caring for or visiting someone with the flu, wash your hands each time you enter and leave the room. Follow these steps:    Use warm water and plenty of soap. Rub your hands together well.    Clean the whole hand, under your nails, between your fingers, and up the wrists.    Wash for at least 15 seconds.    Rinse, letting the water run down your fingers, not up your wrists.    Dry your hands well. Use a paper towel to turn off the faucet and open the door.  Using Alcohol-Based Hand   Alcohol-based hand  are also a good choice. Use them when you don t have access to soap and water. Follow these steps:    Squeeze about a tablespoon of gel into the palm of one hand.    Rub your hands together briskly, cleaning the backs of your hands, the palms, between your fingers, and up the wrists.    Rub until the gel is gone and your hands are completely dry.  Preventing Influenza in Healthcare Settings  The flu is a special concern for people in hospitals and long-term care facilities. To help prevent the spread of flu, many hospitals and nursing homes take these steps:    Health care providers wash their hands or use an alcohol-based hand  before and after treating each patient.    People with the flu have private rooms and bathrooms or share a room with someone with the same infection.    High-risk patients who don t have the flu are encouraged to get the flu and pneumonia vaccines.    All health care workers are encouraged or required to get flu shots.        3735-8909 [employer]. [address] [place] [address], [place] [address]. All rights reserved. This information is not intended as a substitute for professional medical care. Always follow your healthcare professional's  instructions.              Nicole Joy Siegler, PA-C  [facility]

## 2017-03-24 NOTE — MR AVS SNAPSHOT
After Visit Summary   3/24/2017    Rajwinder Lagos    MRN: [id number]           Patient Information     Date Of Birth          1970        Visit Information        Provider Department      3/24/2017 10:50 AM Siegler, Nicole Joy, PA-C [facility]        Today's Diagnoses     Influenza with respiratory manifestation other than pneumonia    -  1    Throat pain        Fever, unspecified fever cause          Care Instructions      Influenza  Influenza ( the flu ) is an infection that affects your respiratory tract (the mouth, nose, and lungs, and the passages between them). Unlike a cold, the flu can make you very ill. And it can lead to pneumonia, a serious lung infection. For some people, especially older adults, young children, and people with certain chronic conditions, the flu can have serious complications and even be fatal.  What Are the Risk Factors for the Flu?     Viruses that cause influenza spread through the air in droplets when someone who has the flu coughs, sneezes, laughs, or talks.   Anyone can get the flu. But you re more likely to become infected if you:    Have a weakened immune system.    Work in a health care setting where you may be exposed to flu germs.    Live or work with someone who has the flu.    Haven t received an annual flu shot.  How Does the Flu Spread?  The flu is caused by viruses. The viruses spread through the air in droplets when someone who has the flu coughs, sneezes, laughs, or talks. You can become infected when you inhale these viruses directly. You can also become infected when you touch a surface on which the droplets have landed and then transfer the germs to your eyes, nose, or mouth. Touching used tissues, or sharing utensils, drinking glasses, or a toothbrush with an infected person can expose you to flu viruses, too.  What Are the Symptoms of the Flu?  Flu symptoms tend to come on quickly and may last a few days to  a few weeks. They include:    Fever usually higher than 101 F  (38.3 C) and chills    Sore throat and headache    Dry cough    Runny nose    Tiredness and weakness    Muscle aches  Factors That Can Make Flu Worse  For some people, the flu can be very serious. The risk of complications is greater for:    Children under age 5.    Adults 65 years of age and older.    People with a chronic illness, such as diabetes or heart, kidney, or lung disease.    People who live in a nursing home or long-term care facility.   How Is the Flu Treated?  Influenza usually improves after 7 days or so. In some cases, your health care provider may prescribe an antiviral medication. This may help you get well sooner. For the medication to help, you need to take it as soon as possible (ideally within 48 hours) after your symptoms start. If you develop pneumonia or other serious illness, hospital care may be needed.  Easing Flu Symptoms    Drink lots of fluids such as water, juice, and warm soup. A good rule is to drink enough so that you urinate your normal amount.    Get plenty of rest.    Ask your health care provider what to take for fever and pain.    Call your provider if your fever rises over 101 F (38.3 C) or you become dizzy, lightheaded, or short of breath.  Taking Steps to Protect Others    Wash your hands often, especially after coughing or sneezing. Or, clean your hands with an alcohol-based hand  containing at least 60 percent alcohol.    Cough or sneeze into a tissue. Then throw the tissue away and wash your hands. If you don t have a tissue, cough and sneeze into the crook of your elbow.    Stay home until at least 24 hours after you no longer have a fever or chills. Be sure the fever isn t being hidden by fever-reducing medication.    Don t share food, utensils, drinking glasses, or a toothbrush with others.    Ask your health care provider if others in your household should receive antiviral medication to help them  avoid infection.  How Can the Flu Be Prevented?    One of the best ways to avoid the flu is to get a flu vaccination each year. Viruses that cause the flu change from year to year. For that reason, doctors recommend getting the flu vaccine each year, as soon as it's available in your area. The vaccine may be given as a shot or as a nasal spray. Your health care provider can tell you which vaccine is right for you.    Wash your hands often. Frequent handwashing is a proven way to help prevent infection.    Carry an alcohol-based hand gel containing at least 60 percent alcohol. Use it when you don t have access to soap and water. Then wash your hands as soon as you can.    Avoid touching your eyes, nose, and mouth.    At home and work, clean phones, computer keyboards, and toys often with disinfectant wipes.    If possible, avoid close contact with others who have the flu or symptoms of the flu.  Handwashing Tips  Handwashing is one of the best ways to prevent many common infections. If you re caring for or visiting someone with the flu, wash your hands each time you enter and leave the room. Follow these steps:    Use warm water and plenty of soap. Rub your hands together well.    Clean the whole hand, under your nails, between your fingers, and up the wrists.    Wash for at least 15 seconds.    Rinse, letting the water run down your fingers, not up your wrists.    Dry your hands well. Use a paper towel to turn off the faucet and open the door.  Using Alcohol-Based Hand   Alcohol-based hand  are also a good choice. Use them when you don t have access to soap and water. Follow these steps:    Squeeze about a tablespoon of gel into the palm of one hand.    Rub your hands together briskly, cleaning the backs of your hands, the palms, between your fingers, and up the wrists.    Rub until the gel is gone and your hands are completely dry.  Preventing Influenza in Healthcare Settings  The flu is a special  "concern for people in hospitals and long-term care facilities. To help prevent the spread of flu, many hospitals and nursing homes take these steps:    Health care providers wash their hands or use an alcohol-based hand  before and after treating each patient.    People with the flu have private rooms and bathrooms or share a room with someone with the same infection.    High-risk patients who don t have the flu are encouraged to get the flu and pneumonia vaccines.    All health care workers are encouraged or required to get flu shots.        0024-9130 [employer]. [address] [place] [address]. All rights reserved. This information is not intended as a substitute for professional medical care. Always follow your healthcare professional's instructions.              Follow-ups after your visit        Who to contact     If you have questions or need follow up information about today's clinic visit or your schedule please contact [facility] directly at [phone].  Normal or non-critical lab and imaging results will be communicated to you by [employer], letter or phone within 4 business days after the clinic has received the results. If you do not hear from us within 7 days, please contact the clinic through [employer] or phone. If you have a critical or abnormal lab result, we will notify you by phone as soon as possible.  Submit refill requests through [employer] or call your pharmacy and they will forward the refill request to us. Please allow 3 business days for your refill to be completed.          Additional Information About Your Visit        [employer] Information     [employer] lets you send messages to your doctor, view your test results, renew your prescriptions, schedule appointments and more. To sign up, go to [email or web address] . Click on \"Log in\" on the left side of the screen, which will take you to the Welcome page. Then click on \"Sign up Now\" on " "the right side of the page.     You will be asked to enter the access code listed below, as well as some personal information. Please follow the directions to create your username and password.     Your access code is: [email or web address]  Expires: 2017  3:54 PM     Your access code will  in 90 days. If you need help or a new code, please call your [facility] clinic or [phone].        Care EveryWhere ID     This is your Care EveryWhere ID. This could be used by other organizations to access your [facility] medical records  [id number]        Your Vitals Were     Pulse Temperature Respirations Height Last Period Pulse Oximetry    117 101.7  F (38.7  C) (Tympanic) 16 5' 5.75\" (1.67 m) 2017 99%    Breastfeeding? BMI (Body Mass Index)                No 19.84 kg/m2           Blood Pressure from Last 3 Encounters:   17 122/80   17 114/70   17 120/80    Weight from Last 3 Encounters:   17 122 lb (55.3 kg)   17 125 lb (56.7 kg)   17 124 lb (56.2 kg)              We Performed the Following     Beta strep group A culture     Influenza A/B antigen     Strep, Rapid Screen          Today's Medication Changes          These changes are accurate as of: 3/24/17 11:40 AM.  If you have any questions, ask your nurse or doctor.               Start taking these medicines.        Dose/Directions    oseltamivir 75 MG capsule   Commonly known as:  TAMIFLU   Used for:  Influenza with respiratory manifestation other than pneumonia   Started by:  Siegler, Nicole Joy, PA-C        Dose:  75 mg   Take 1 capsule (75 mg) by mouth 2 times daily   Quantity:  10 capsule   Refills:  0            Where to get your medicines      These medications were sent to [facility], [place] - [address], [address]  [address], [address], [place] [address]     Phone:  [phone]     oseltamivir 75 MG capsule                Primary Care Provider    Physician No Ref-Primary       " No address on file        Thank you!     Thank you for choosing [facility]  for your care. Our goal is always to provide you with excellent care. Hearing back from our patients is one way we can continue to improve our services. Please take a few minutes to complete the written survey that you may receive in the mail after your visit with us. Thank you!             Your Updated Medication List - Protect others around you: Learn how to safely use, store and throw away your medicines at www.disposemymeds.org.          This list is accurate as of: 3/24/17 11:40 AM.  Always use your most recent med list.                   Brand Name Dispense Instructions for use    methylcellulose (laxative) Powd    CITRUCEL     One tablespoon of powder in water daily. May increase every 4-5 days as needed.       oseltamivir 75 MG capsule    TAMIFLU    10 capsule    Take 1 capsule (75 mg) by mouth 2 times daily

## 2017-03-27 LAB
BACTERIA SPEC CULT: NORMAL
MICRO REPORT STATUS: NORMAL
SPECIMEN SOURCE: NORMAL

## 2017-05-03 ENCOUNTER — OFFICE VISIT (OUTPATIENT)
Dept: OPTOMETRY | Facility: CLINIC | Age: 47
End: 2017-05-03
Payer: COMMERCIAL

## 2017-05-03 DIAGNOSIS — H02.88B MEIBOMIAN GLAND DYSFUNCTION (MGD) OF UPPER AND LOWER LIDS OF BOTH EYES: ICD-10-CM

## 2017-05-03 DIAGNOSIS — H02.88A MEIBOMIAN GLAND DYSFUNCTION (MGD) OF UPPER AND LOWER LIDS OF BOTH EYES: ICD-10-CM

## 2017-05-03 DIAGNOSIS — H52.13 MYOPIA OF BOTH EYES: Primary | ICD-10-CM

## 2017-05-03 DIAGNOSIS — H04.123 DRY EYES: ICD-10-CM

## 2017-05-03 PROCEDURE — 92004 COMPRE OPH EXAM NEW PT 1/>: CPT | Performed by: OPTOMETRIST

## 2017-05-03 PROCEDURE — 92015 DETERMINE REFRACTIVE STATE: CPT | Performed by: OPTOMETRIST

## 2017-05-03 PROCEDURE — 92310 CONTACT LENS FITTING OU: CPT | Mod: GA | Performed by: OPTOMETRIST

## 2017-05-03 ASSESSMENT — VISUAL ACUITY
OS_SC: 20/125
OS_CC: 20/80
OS_CC: 20/20
OS_CC+: -1
OD_SC: 20/125
OD_CC+: -1
OD_CC: 20/20
OD_CC: 20/80
METHOD: SNELLEN - LINEAR
CORRECTION_TYPE: CONTACTS

## 2017-05-03 ASSESSMENT — REFRACTION_MANIFEST
OD_CYLINDER: +0.25
OD_ADD: +1.75
OS_ADD: +1.75
OD_SPHERE: -4.25
OD_CYLINDER: +0.25
OS_AXIS: 102
OD_AXIS: 035
OD_AXIS: 032
OD_SPHERE: -4.25
OS_CYLINDER: +0.50
METHOD_AUTOREFRACTION: 1
OS_SPHERE: -5.50
OS_CYLINDER: +0.50
OS_SPHERE: -5.50
OS_AXIS: 107

## 2017-05-03 ASSESSMENT — CONF VISUAL FIELD
OD_NORMAL: 1
OS_NORMAL: 1

## 2017-05-03 ASSESSMENT — REFRACTION_CURRENTRX
OD_SPHERE: -4.00
OS_SPHERE: -4.75
OS_BASECURVE: 8.6
OS_BRAND: NIGHT AND DAY
OD_DIAMETER: 13.8
OD_BASECURVE: 8.6
OS_DIAMETER: 13.8
OD_BRAND: NIGHT AND DAY

## 2017-05-03 ASSESSMENT — KERATOMETRY
METHOD_AUTO_MANUAL: AUTOMATED
OS_AXISANGLE_DEGREES: 96
OD_AXISANGLE2_DEGREES: 175
OS_K2POWER_DIOPTERS: 44.12
OD_AXISANGLE_DEGREES: 85
OD_K2POWER_DIOPTERS: 42.87
OD_K1POWER_DIOPTERS: 42.50
OS_K1POWER_DIOPTERS: 43.12
OS_AXISANGLE2_DEGREES: 6

## 2017-05-03 ASSESSMENT — EXTERNAL EXAM - LEFT EYE: OS_EXAM: NORMAL

## 2017-05-03 ASSESSMENT — TONOMETRY
OS_IOP_MMHG: 16
IOP_METHOD: APPLANATION
OD_IOP_MMHG: 16

## 2017-05-03 ASSESSMENT — SLIT LAMP EXAM - LIDS: COMMENTS: MEIBOMIAN GLAND DYSFUNCTION

## 2017-05-03 ASSESSMENT — CUP TO DISC RATIO
OD_RATIO: 0.25
OS_RATIO: 0.3/0.25

## 2017-05-03 ASSESSMENT — EXTERNAL EXAM - RIGHT EYE: OD_EXAM: NORMAL

## 2017-05-03 NOTE — MR AVS SNAPSHOT
After Visit Summary   5/3/2017    Rajwinder Lagos    MRN: [id number]           Patient Information     Date Of Birth          1970        Visit Information        Provider Department      5/3/2017 11:00 AM Moraima Fry OD [facility]        Today's Diagnoses     Myopia of both eyes    -  1    Dry eyes        Meibomian gland dysfunction (MGD) of upper and lower lids of both eyes          Care Instructions     DRY EYE TREATMENT    I recommend using artificial tears for your dry eye. There are over the counter drops that work well and may be used up to 4x daily. ( systane balance, refresh optive, soothe xp) If you need more than 4 drops daily, use a preservative free product which come in individual vials which may be used for 24 hours and discarded.   Artificial tears work best as a preventative and not as well after your eyes are starting to bother you.  It may take 4- 6 weeks of using the drops before you notice improvement.  If after that time you are still having problems schedule an appointment for an evaluation and discussion of different treatments.  Dry eyes are a chronic condition and you may have more symptoms at certain times of the year.      In addition:  Warm compresses once to twice daily for 5-10 minutes  Use a rice pack in the microwave to retain the heat     Omega 3 fatty acid supplements 1-2x daily   Use blink contacts over lenses  Try clear care every day instead of optifree     +1.50 readers over the contacts as needed for close work        Follow-ups after your visit        Follow-up notes from your care team     Return in about 1 year (around 5/3/2018) for Annual Visit.      Who to contact     If you have questions or need follow up information about today's clinic visit or your schedule please contact [facility] directly at [phone].  Normal or non-critical lab and imaging results will be communicated to you by MyChart, letter  "or phone within 4 business days after the clinic has received the results. If you do not hear from us within 7 days, please contact the clinic through [employer] or phone. If you have a critical or abnormal lab result, we will notify you by phone as soon as possible.  Submit refill requests through [employer] or call your pharmacy and they will forward the refill request to us. Please allow 3 business days for your refill to be completed.          Additional Information About Your Visit        [email or web address] Information     Mil[employer]ra lets you send messages to your doctor, view your test results, renew your prescriptions, schedule appointments and more. To sign up, go to [email or web address] . Click on \"Log in\" on the left side of the screen, which will take you to the Welcome page. Then click on \"Sign up Now\" on the right side of the page.     You will be asked to enter the access code listed below, as well as some personal information. Please follow the directions to create your username and password.     Your access code is: [email or web address]  Expires: 2017 12:09 PM     Your access code will  in 90 days. If you need help or a new code, please call your [place] clinic or [phone].        Care EveryWhere ID     This is your Care EveryWhere ID. This could be used by other organizations to access your [place] medical records  [id number]         Blood Pressure from Last 3 Encounters:   17 122/80   17 114/70   17 120/80    Weight from Last 3 Encounters:   17 55.3 kg (122 lb)   17 56.7 kg (125 lb)   17 56.2 kg (124 lb)              Today, you had the following     No orders found for display       Primary Care Provider    Physician No Ref-Primary       No address on file        Thank you!     Thank you for choosing [facility]  for your care. Our goal is always to provide you with excellent care. Hearing back from our patients is one way we can continue to improve our " services. Please take a few minutes to complete the written survey that you may receive in the mail after your visit with us. Thank you!             Your Updated Medication List - Protect others around you: Learn how to safely use, store and throw away your medicines at www.disposemymeds.org.          This list is accurate as of: 5/3/17 12:10 PM.  Always use your most recent med list.                   Brand Name Dispense Instructions for use    methylcellulose (laxative) Powd    CITRUCEL     One tablespoon of powder in water daily. May increase every 4-5 days as needed.       oseltamivir 75 MG capsule    TAMIFLU    10 capsule    Take 1 capsule (75 mg) by mouth 2 times daily

## 2017-05-03 NOTE — PROGRESS NOTES
Chief Complaint   Patient presents with     COMPREHENSIVE EYE EXAM     Routine Contacts, OS occassionally cloudy, has glasses, forgot them.  Close up is going does not use readers  Increase mucous in OS, no itching   Sometimes replaces <1 mo  Previous contact lens wearer? Yes: Oasys  Comfort of contact lenses :Good  Satisfied with current lenses: Yes        Last Eye Exam: 1yr  Dilated Previously: Yes    What are you currently using to see?  contacts    Distance Vision Acuity: Noticed gradual change in both eyes    Near Vision Acuity: Not satisfied     Eye Comfort: good  Do you use eye drops? : Yes: Blink  Occupation or Hobbies: Computer    Contact  Lens Exam        Trial lenses  OD: Air Optix 8.6/14.2 -4.00  OS: Air Optix 8.6/14.2 -5.00  Good fit, no staining  mgd / dry eye injection intrapalpebral  Very mild gpc    orx   Pl  +0.25 blurs OS, likes -5.00   Wet was blurry , orx +0.25   Visual acuity     Discussed cleaning and disinfecting with clear care solution and a replacement schedule of monthly   The above trials, a contact lens care kit, along with a final contact lens prescription were dispensed today  Return to clinic with any problems before ordering your supply of lenses  .  A yearly eye exam is recommended, however, you do not need to have a contact lens fitting annually unless you are having problems with the contact lenses.          Medical, surgical and family histories reviewed and updated 5/3/2017.       OBJECTIVE: See Ophthalmology exam    ASSESSMENT:    ICD-10-CM    1. Myopia of both eyes H52.13 EYE EXAM (SIMPLE-NONBILLABLE)     REFRACTION     CONTACT LENS FITTING,BILAT      PLAN:   Updated glasses and contact lens prescription   Dispensed trials  OD: Air Optix 8.6/14.2 -4.00  OS: Air Optix 8.6/14.2 -4.75  Dispensed clearcare with blink contacts/ switch solution   Warm compresses / artificial tears / omega 3 fa supplements  Readers over contacts +1.50  For computer/ reading    Moraima Fry OD

## 2017-05-03 NOTE — PATIENT INSTRUCTIONS
DRY EYE TREATMENT    I recommend using artificial tears for your dry eye. There are over the counter drops that work well and may be used up to 4x daily. ( systane balance, refresh optive, soothe xp) If you need more than 4 drops daily, use a preservative free product which come in individual vials which may be used for 24 hours and discarded.   Artificial tears work best as a preventative and not as well after your eyes are starting to bother you.  It may take 4- 6 weeks of using the drops before you notice improvement.  If after that time you are still having problems schedule an appointment for an evaluation and discussion of different treatments.  Dry eyes are a chronic condition and you may have more symptoms at certain times of the year.      In addition:  Warm compresses once to twice daily for 5-10 minutes  Use a rice pack in the microwave to retain the heat     Omega 3 fatty acid supplements 1-2x daily   Use blink contacts over lenses  Try clear care every day instead of optifree     +1.50 readers over the contacts as needed for close work

## 2018-05-08 ENCOUNTER — OFFICE VISIT (OUTPATIENT)
Dept: OPTOMETRY | Facility: CLINIC | Age: 48
End: 2018-05-08
Payer: COMMERCIAL

## 2018-05-08 DIAGNOSIS — H04.123 DRY EYES: ICD-10-CM

## 2018-05-08 DIAGNOSIS — H52.4 PRESBYOPIA: ICD-10-CM

## 2018-05-08 DIAGNOSIS — H02.88B MEIBOMIAN GLAND DYSFUNCTION (MGD) OF UPPER AND LOWER LIDS OF BOTH EYES: ICD-10-CM

## 2018-05-08 DIAGNOSIS — H52.13 MYOPIA OF BOTH EYES WITH ASTIGMATISM: Primary | ICD-10-CM

## 2018-05-08 DIAGNOSIS — H10.13 ALLERGIC CONJUNCTIVITIS OF BOTH EYES: ICD-10-CM

## 2018-05-08 DIAGNOSIS — H52.203 MYOPIA OF BOTH EYES WITH ASTIGMATISM: Primary | ICD-10-CM

## 2018-05-08 DIAGNOSIS — H02.88A MEIBOMIAN GLAND DYSFUNCTION (MGD) OF UPPER AND LOWER LIDS OF BOTH EYES: ICD-10-CM

## 2018-05-08 PROCEDURE — 92015 DETERMINE REFRACTIVE STATE: CPT | Performed by: OPTOMETRIST

## 2018-05-08 PROCEDURE — 92014 COMPRE OPH EXAM EST PT 1/>: CPT | Performed by: OPTOMETRIST

## 2018-05-08 RX ORDER — OLOPATADINE HYDROCHLORIDE 1 MG/ML
1 SOLUTION/ DROPS OPHTHALMIC 2 TIMES DAILY PRN
Qty: 5 ML | Refills: 12 | Status: SHIPPED | OUTPATIENT
Start: 2018-05-08 | End: 2019-10-24

## 2018-05-08 ASSESSMENT — VISUAL ACUITY
OD_CC: 20/80
OS_CC: 20/20
OS_SC: 20/200
OD_SC: 20/200
CORRECTION_TYPE: GLASSES
METHOD: SNELLEN - LINEAR
OS_CC: 20/60
OD_CC: 20/20
OS_CC+: -3

## 2018-05-08 ASSESSMENT — REFRACTION_WEARINGRX
OD_AXIS: 060
SPECS_TYPE: SVL
OS_CYLINDER: +1.00
OS_AXIS: 086
OS_SPHERE: -5.25
OD_CYLINDER: +0.50
OD_SPHERE: -4.50

## 2018-05-08 ASSESSMENT — EXTERNAL EXAM - LEFT EYE: OS_EXAM: NORMAL

## 2018-05-08 ASSESSMENT — REFRACTION_MANIFEST
OS_SPHERE: -5.50
OS_SPHERE: -5.00
OD_CYLINDER: +0.25
OD_ADD: +1.75
OS_AXIS: 83
METHOD_AUTOREFRACTION: 1
OS_ADD: +1.75
OD_CYLINDER: +0.50
OD_SPHERE: -4.50
OS_CYLINDER: +0.25
OS_AXIS: 115
OD_SPHERE: -4.00
OD_AXIS: 033
OS_CYLINDER: +0.50
OD_AXIS: 045

## 2018-05-08 ASSESSMENT — EXTERNAL EXAM - RIGHT EYE: OD_EXAM: NORMAL

## 2018-05-08 ASSESSMENT — TONOMETRY
OS_IOP_MMHG: 17
IOP_METHOD: APPLANATION
OD_IOP_MMHG: 17

## 2018-05-08 ASSESSMENT — SLIT LAMP EXAM - LIDS: COMMENTS: MEIBOMIAN GLAND DYSFUNCTION

## 2018-05-08 ASSESSMENT — CONF VISUAL FIELD
OD_NORMAL: 1
OS_NORMAL: 1

## 2018-05-08 ASSESSMENT — CUP TO DISC RATIO
OS_RATIO: 0.3/0.25
OD_RATIO: 0.25

## 2018-05-08 ASSESSMENT — REFRACTION_CURRENTRX
OD_BRAND: AIR OPTIX NIGHT AND DAY
OS_SPHERE: -4.75
OS_BRAND: AIR OPTIX NIGHT AND DAY
OD_DIAMETER: 13.8
OS_BASECURVE: 8.6
OS_DIAMETER: 13.8
OD_SPHERE: -4.00
OD_BASECURVE: 8.6

## 2018-05-08 NOTE — PROGRESS NOTES
Chief Complaint   Patient presents with     COMPREHENSIVE EYE EXAM     Blurry vision      OS is itchy and red and mucous  Gets filmy , charlie and tears and stringy discharge   Concerns with eye health as her father who has wet AMD and glaucoma     Last Eye Exam: 1year  Dilated Previously: Yes    What are you currently using to see?  glasses       Distance Vision Acuity: Satisfied with vision    Near Vision Acuity: Satisfied with vision while reading  with glasses    Eye Comfort: good  Do you use eye drops? : No  Occupation or Hobbies: Everyday          HPI    Symptoms:     Blurred vision                      Medical, surgical and family histories reviewed and updated 5/8/2018.       OBJECTIVE: See Ophthalmology exam    ASSESSMENT:    ICD-10-CM    1. Myopia of both eyes with astigmatism H52.13 EYE EXAM (SIMPLE-NONBILLABLE)    H52.203 REFRACTION   2. Dry eyes H04.123    3. Meibomian gland dysfunction (MGD) of upper and lower lids of both eyes H02.89 EYE EXAM (SIMPLE-NONBILLABLE)   4. Allergic conjunctivitis of both eyes H10.13 EYE EXAM (SIMPLE-NONBILLABLE)     olopatadine (PATANOL) 0.1 % ophthalmic solution      PLAN:   Add patanol two times daily  Due for contact lens fitting next year  Changed prescription back to night and day lenses for the year  And dispensed trials until she orders supply    Moraima Fry OD

## 2018-05-08 NOTE — MR AVS SNAPSHOT
After Visit Summary   5/8/2018    Rajwinder Lagos    MRN: [id number]           Patient Information     Date Of Birth          1970        Visit Information        Provider Department      5/8/2018 9:40 AM Moraima Fry OD [facility]        Today's Diagnoses     Myopia of both eyes with astigmatism    -  1    Dry eyes        Meibomian gland dysfunction (MGD) of upper and lower lids of both eyes        Allergic conjunctivitis of both eyes          Care Instructions    Use allergy drops twice daily in am before instillation of contacts and after removal  Use blink contacts twice daily while wearing contacts    Use readers over contacts +1.50 for computer +1.75 if closer   Continue with clearcare solution    Glasses are slightly off    Meibomian gland dysfunction or Posterior Blepharitis, is characterized by inflammation along both the uppper and lower eyelid margins. A single row of these glands is present in each lid with openings along the lid margins.  It is often found in association with skin conditions such as rosacea and seborrheic dermatitis.    Symptoms include:  ?Red eyes  ?Gritty or burning sensation  ?Burning sensation  ?Excessive tearing  ?Itchy eyelids  ?Red, swollen eyelids  ?Crusting or matting of eyelashes in the morning  ?Light sensitivity  ?Blurred vision    It is important to keep cosmetics from blocking these oil glands. If blocked, they do not   excrete oil into the tear film, which causes the tears to evaporate quickly.   This may result in watery eyes.  There is also an increase of bacterial growth when the tear film is unstable, leading to further ocular surface inflammation.    Warm compresses are very beneficial to the normal functioning of the eye.  Warm compresses for 5-10 minutes twice daily and keeping the lid margins clean  and help maintain a good tear film.   Moisten a washcloth with hot water, or microwave for 10 seconds, being  "careful to not get the cloth too hot.   Then put the washcloth onto your eyelids for 5 minutes. It will cool quickly so a rice pack or eyemask that can be heated and laid on top of the washcloth will help retain the heat.    Omega 3 fatty acid supplements taken once to twice daily and artificial tears such as Soothe xp, Refresh optive , and systane balance are also an additional treatment to control inflammation and help soothe your eyes.     warm compresses will make allergies worse, so may want to wait until inflammation is under control, you need preservative free artificial tears when lenses are out as needed          Follow-ups after your visit        Who to contact     If you have questions or need follow up information about today's clinic visit or your schedule please contact [facility] directly at [phone].  Normal or non-critical lab and imaging results will be communicated to you by [employer], letter or phone within 4 business days after the clinic has received the results. If you do not hear from us within 7 days, please contact the clinic through [employer] or phone. If you have a critical or abnormal lab result, we will notify you by phone as soon as possible.  Submit refill requests through [employer] or call your pharmacy and they will forward the refill request to us. Please allow 3 business days for your refill to be completed.          Additional Information About Your Visit        [employer] Information     [employer] lets you send messages to your doctor, view your test results, renew your prescriptions, schedule appointments and more. To sign up, go to [email or web address] . Click on \"Log in\" on the left side of the screen, which will take you to the Welcome page. Then click on \"Sign up Now\" on the right side of the page.     You will be asked to enter the access code listed below, as well as some personal information. Please follow the directions to create your username and password.   "   Your access code is: [email or web address]  Expires: 2018 10:39 AM     Your access code will  in 90 days. If you need help or a new code, please call your [place] clinic or [phone].        Care EveryWhere ID     This is your Care EveryWhere ID. This could be used by other organizations to access your [place] medical records  [id number]         Blood Pressure from Last 3 Encounters:   17 122/80   17 114/70   17 120/80    Weight from Last 3 Encounters:   17 55.3 kg (122 lb)   17 56.7 kg (125 lb)   17 56.2 kg (124 lb)              We Performed the Following     EYE EXAM (SIMPLE-NONBILLABLE)     REFRACTION          Today's Medication Changes          These changes are accurate as of 18 10:39 AM.  If you have any questions, ask your nurse or doctor.               Start taking these medicines.        Dose/Directions    olopatadine 0.1 % ophthalmic solution   Commonly known as:  PATANOL   Used for:  Allergic conjunctivitis of both eyes   Started by:  Moraima Fry, FRANKI        Dose:  1 drop   Place 1 drop into both eyes 2 times daily as needed for allergies   Quantity:  5 mL   Refills:  12            Where to get your medicines      These medications were sent to [facility]/pharmacy #[id number] [facility] [address] [address]     Phone:  [phone]     olopatadine 0.1 % ophthalmic solution                Primary Care Provider Fax #    Physician No Ref-Primary [phone]       No address on file        Equal Access to Services     IRENE MARTINEZ AH: Hadii ina tomlinsono Soayla, waaxda luqadaha, qaybta kaalmada ademarshayaroly, trinidad rendon. So [facility] [phone].    ATENCIÓN: Si habla español, tiene a umaña disposición servicios gratuitos de asistencia lingüística. Llame al [phone].    We comply with applicable federal civil rights laws and [place] laws. We do not discriminate on the basis of race, color, national  origin, age, disability, sex, sexual orientation, or gender identity.            Thank you!     Thank you for choosing [facility]  for your care. Our goal is always to provide you with excellent care. Hearing back from our patients is one way we can continue to improve our services. Please take a few minutes to complete the written survey that you may receive in the mail after your visit with us. Thank you!             Your Updated Medication List - Protect others around you: Learn how to safely use, store and throw away your medicines at www.disposemymeds.org.          This list is accurate as of 5/8/18 10:39 AM.  Always use your most recent med list.                   Brand Name Dispense Instructions for use Diagnosis    methylcellulose (laxative) Powd    CITRUCEL     One tablespoon of powder in water daily. May increase every 4-5 days as needed.    LLQ abdominal pain       olopatadine 0.1 % ophthalmic solution    PATANOL    5 mL    Place 1 drop into both eyes 2 times daily as needed for allergies    Allergic conjunctivitis of both eyes       oseltamivir 75 MG capsule    TAMIFLU    10 capsule    Take 1 capsule (75 mg) by mouth 2 times daily    Influenza with respiratory manifestation other than pneumonia

## 2018-05-08 NOTE — PATIENT INSTRUCTIONS
Use allergy drops twice daily in am before instillation of contacts and after removal  Use blink contacts twice daily while wearing contacts    Use readers over contacts +1.50 for computer +1.75 if closer   Continue with clearcare solution    Glasses are slightly off    Meibomian gland dysfunction or Posterior Blepharitis, is characterized by inflammation along both the uppper and lower eyelid margins. A single row of these glands is present in each lid with openings along the lid margins.  It is often found in association with skin conditions such as rosacea and seborrheic dermatitis.    Symptoms include:  ?Red eyes  ?Gritty or burning sensation  ?Burning sensation  ?Excessive tearing  ?Itchy eyelids  ?Red, swollen eyelids  ?Crusting or matting of eyelashes in the morning  ?Light sensitivity  ?Blurred vision    It is important to keep cosmetics from blocking these oil glands. If blocked, they do not   excrete oil into the tear film, which causes the tears to evaporate quickly.   This may result in watery eyes.  There is also an increase of bacterial growth when the tear film is unstable, leading to further ocular surface inflammation.    Warm compresses are very beneficial to the normal functioning of the eye.  Warm compresses for 5-10 minutes twice daily and keeping the lid margins clean  and help maintain a good tear film.   Moisten a washcloth with hot water, or microwave for 10 seconds, being careful to not get the cloth too hot.   Then put the washcloth onto your eyelids for 5 minutes. It will cool quickly so a rice pack or eyemask that can be heated and laid on top of the washcloth will help retain the heat.    Omega 3 fatty acid supplements taken once to twice daily and artificial tears such as Soothe xp, Refresh optive , and systane balance are also an additional treatment to control inflammation and help soothe your eyes.     warm compresses will make allergies worse, so may want to wait until inflammation  is under control, you need preservative free artificial tears when lenses are out as needed

## 2018-05-08 NOTE — LETTER
5/8/2018         RE: Rajwinder Lagos  [address]  [place] [address]        Dear Colleague,    Thank you [facility] your [facility]inder [facility]os[facility] Please see a copy of my visit note below.    Chief Complaint   Patient presents with     COMPREHENSIVE EYE EXAM     Blurry vision      OS is itchy and red and mucous  Gets filmy , charlie and tears and stringy discharge   Concerns with eye health as her father who has wet AMD and glaucoma     Last Eye Exam: 1year  Dilated Previously: Yes    What are you currently using to see?  glasses       Distance Vision Acuity: Satisfied with vision    Near Vision Acuity: Satisfied with vision while reading  with glasses    Eye Comfort: good  Do you use eye drops? : No  Occupation or Hobbies: Everyday          HPI    Symptoms:     Blurred vision                      Medical, surgical and family histories reviewed and updated 5/8/2018.       OBJECTIVE: See Ophthalmology exam    ASSESSMENT:    ICD-10-CM    1. Myopia of both eyes with astigmatism H52.13 EYE EXAM (SIMPLE-NONBILLABLE)    H52.203 REFRACTION   2. Dry eyes H04.123    3. Meibomian gland dysfunction (MGD) of upper and lower lids of both eyes H02.89 EYE EXAM (SIMPLE-NONBILLABLE)   4. Allergic conjunctivitis of both eyes H10.13 EYE EXAM (SIMPLE-NONBILLABLE)     olopatadine (PATANOL) 0.1 % ophthalmic solution      PLAN:   Add patanol two times daily  Due for contact lens fitting next year  Changed prescription back to night and day lenses for the year  And dispensed trials until she orders supply    Moraima Fry OD     Again, thank you for allowing me to participate in the care of your patient.        Sincerely,        Moraima Fry, OD

## 2019-10-24 ENCOUNTER — OFFICE VISIT (OUTPATIENT)
Dept: OPTOMETRY | Facility: CLINIC | Age: 49
End: 2019-10-24
Payer: COMMERCIAL

## 2019-10-24 DIAGNOSIS — H10.13 ALLERGIC CONJUNCTIVITIS OF BOTH EYES: ICD-10-CM

## 2019-10-24 DIAGNOSIS — H10.10 SEASONAL ALLERGIC CONJUNCTIVITIS: ICD-10-CM

## 2019-10-24 DIAGNOSIS — H52.13 MYOPIA OF BOTH EYES: Primary | ICD-10-CM

## 2019-10-24 DIAGNOSIS — H04.129 DRY EYE: ICD-10-CM

## 2019-10-24 PROCEDURE — 92310 CONTACT LENS FITTING OU: CPT | Performed by: OPTOMETRIST

## 2019-10-24 PROCEDURE — 92015 DETERMINE REFRACTIVE STATE: CPT | Performed by: OPTOMETRIST

## 2019-10-24 PROCEDURE — 92014 COMPRE OPH EXAM EST PT 1/>: CPT | Performed by: OPTOMETRIST

## 2019-10-24 RX ORDER — OLOPATADINE HYDROCHLORIDE 1 MG/ML
1 SOLUTION/ DROPS OPHTHALMIC 2 TIMES DAILY
Qty: 5 ML | Refills: 12 | Status: SHIPPED | OUTPATIENT
Start: 2019-10-24 | End: 2020-03-12

## 2019-10-24 ASSESSMENT — REFRACTION_CURRENTRX
OD_SPHERE: -4.00
OD_BRAND: AIR OPTIX NIGHT AND DAY
OS_SPHERE: -4.50
OS_DIAMETER: 13.8
OD_SPHERE: -4.00
OS_BRAND: ALCON DAILIES TOTAL 1 BC 8.5, D 14.1
OS_BASECURVE: 8.6
OS_BRAND: AIR OPTIX NIGHT AND DAY
OS_SPHERE: -4.75
OD_BRAND: ALCON DAILIES TOTAL 1 BC 8.5, D 14.1
OD_BASECURVE: 8.6
OD_DIAMETER: 13.8

## 2019-10-24 ASSESSMENT — KERATOMETRY
OD_AXISANGLE_DEGREES: 77
OD_K1POWER_DIOPTERS: 42.75
OD_K2POWER_DIOPTERS: 43.50

## 2019-10-24 ASSESSMENT — REFRACTION_MANIFEST
OS_AXIS: 105
OD_AXIS: 024
OS_CYLINDER: +0.50
OD_CYLINDER: +0.75
OS_SPHERE: -6.75
OS_AXIS: 082
METHOD_AUTOREFRACTION: 1
OD_SPHERE: -4.25
OS_CYLINDER: +1.25
OD_SPHERE: -4.25
OD_CYLINDER: +0.25
OS_SPHERE: -5.50
OD_AXIS: 040

## 2019-10-24 ASSESSMENT — VISUAL ACUITY
OS_CC+: -1
OS_CC: 20/25
METHOD: SNELLEN - LINEAR
OD_CC: 20/80
OS_CC: 20/80
CORRECTION_TYPE: CONTACTS
OD_CC: 20/20

## 2019-10-24 ASSESSMENT — EXTERNAL EXAM - LEFT EYE: OS_EXAM: NORMAL

## 2019-10-24 ASSESSMENT — REFRACTION_WEARINGRX
OS_AXIS: 115
OS_CYLINDER: +0.50
OS_SPHERE: -5.25
OD_AXIS: 045
SPECS_TYPE: SVL
OS_ADD: +1.75
OD_CYLINDER: +0.25
OD_ADD: +1.75
OD_SPHERE: -4.50

## 2019-10-24 ASSESSMENT — TONOMETRY
OD_IOP_MMHG: 17
IOP_METHOD: APPLANATION
OS_IOP_MMHG: 17

## 2019-10-24 ASSESSMENT — CONF VISUAL FIELD
OD_NORMAL: 1
OS_NORMAL: 1
METHOD: COUNTING FINGERS

## 2019-10-24 ASSESSMENT — CUP TO DISC RATIO
OD_RATIO: 0.25
OS_RATIO: 0.3/0.25

## 2019-10-24 ASSESSMENT — EXTERNAL EXAM - RIGHT EYE: OD_EXAM: NORMAL

## 2019-10-24 ASSESSMENT — SLIT LAMP EXAM - LIDS: COMMENTS: MEIBOMIAN GLAND DYSFUNCTION

## 2019-10-24 NOTE — PATIENT INSTRUCTIONS
You may use rewetting drops such as blink or refresh contacts over lenses   and artificial tears when lenses are drying out    There are over the counter drops that work well and may be used up to 4 x daily when lenses are out if your eyes are dry. ( systane , refresh, thera tears) If you need more than 4 drops daily, use a preservative free product which come in individual vials and may be used for 24 hours until finished and discarded.     If you are in not in dailies, consider clear care solution if still having problems and reduce wear time to 10-12 hours      DRY EYE TREATMENT    I recommend using artificial tears for your dry eye. There are over the counter drops that work well and may be used up to 4x daily. ( systane balance, refresh optive, soothe xp)   If you need more than 4 drops daily, use a preservative free product which come in individual vials which may be used for 24 hours and discarded.     Artificial tears work best as a preventative and not as well after your eyes are starting to bother you.  It may take 4- 6 weeks of using the drops before you notice improvement.  If after that time you are still having problems schedule an appointment for an evaluation and discussion of different treatments.  Dry eyes are a chronic condition and you may have more symptoms at certain times of the year.    If no improvement  Go with dailies  Call and I will change prescription   Additional recommended treatment:  Warm compresses once to twice daily for 5-10 minutes    Directions for warm soaks  There are few methods for hot compresses. Moisten a washcloth with hot water, or microwave for 10 seconds, being careful to not get the cloth too hot.   Then put the washcloth onto your eyelids for 5 minutes. It will cool quickly so a rice pack or eyemask that can be heated and laid on top of the washcloth will help retain the heat.          Omega 3 fatty acid supplements taken 1-2x daily  Recommend  at least  2000mg  omega 3  800 EPA  600 DHA    Blink regularly  Stay hydrated  Increase humidity  Wear sunglasses   Avoid direct exposure to forced air--turn air vents in the car away and keep fans from blowing directly on your face

## 2019-10-24 NOTE — LETTER
10/24/2019         RE: Rajwinder Lagos  [address]  [facility] [address]        Dear Colleague,    Thank you for referring your patient, Rajwinder Lagos, to the [facility]. Please see a copy of my visit note below.    Chief Complaint   Patient presents with     Annual Eye Exam   gets worse as time goes on with the ellie     Previous contact lens wearer? Yes: Air Optix night and day  Comfort of contact lenses :Not very comfortable, current pair is possibly ripped. Lots of dryness  Has not been doing warm compresses or artificial tears  , only rewetting drops  Satisfied with current lenses: No        Last Eye Exam: 05/2018  Dilated Previously: Yes    What are you currently using to see?  glasses and contacts    Distance Vision Acuity: Satisfied with vision    Near Vision Acuity: Not satisfied - has been wearing readers over cls. Intermediate is ok.    Eye Comfort: dry  Seasonal allergic conjunctivitis uses patanol fall and spring she is out  Do you use eye drops? : Yes: blink for cls  Occupation or Hobbies: On computers a lot      Yisel Perez CPO     Medical, surgical and family histories reviewed and updated 10/24/2019.       OBJECTIVE: See Ophthalmology exam    ASSESSMENT:    ICD-10-CM    1. Myopia of both eyes H52.13 CONTACT LENS FITTING,BILAT     EYE EXAM (SIMPLE-NONBILLABLE)     REFRACTION   2. Dry eye H04.129 EYE EXAM (SIMPLE-NONBILLABLE)      PLAN:   Try clear care as only solution if no improvement  Go to dailiprincess   Add artificial tears  After removal , currently using contact lens  Rewetting only when in  Refilled patanol with substitutes if insurance problem sent to [facility]  Moraima Fry OD     Again, thank you for allowing me to participate in the care of your patient.        Sincerely,        Moraima Fry, OD

## 2019-10-24 NOTE — PROGRESS NOTES
Chief Complaint   Patient presents with     Annual Eye Exam   gets worse as time goes on with the ellie     Previous contact lens wearer? Yes: Air Optix night and day  Comfort of contact lenses :Not very comfortable, current pair is possibly ripped. Lots of dryness  Has not been doing warm compresses or artificial tears  , only rewetting drops  Satisfied with current lenses: No        Last Eye Exam: 05/2018  Dilated Previously: Yes    What are you currently using to see?  glasses and contacts    Distance Vision Acuity: Satisfied with vision    Near Vision Acuity: Not satisfied - has been wearing readers over cls. Intermediate is ok.    Eye Comfort: dry  Seasonal allergic conjunctivitis uses patanol fall and spring she is out  Do you use eye drops? : Yes: blink for cls  Occupation or Hobbies: On computers a lot      Yisel Perez CPO     Medical, surgical and family histories reviewed and updated 10/24/2019.       OBJECTIVE: See Ophthalmology exam    ASSESSMENT:    ICD-10-CM    1. Myopia of both eyes H52.13 CONTACT LENS FITTING,BILAT     EYE EXAM (SIMPLE-NONBILLABLE)     REFRACTION   2. Dry eye H04.129 EYE EXAM (SIMPLE-NONBILLABLE)      PLAN:   Try clear care as only solution if no improvement  Go to mavis   Add artificial tears  After removal , currently using contact lens  Rewetting only when in  Refilled patanol with substitutes if insurance problem sent to [facility] Shayla Fry OD

## 2019-11-07 ENCOUNTER — HEALTH MAINTENANCE LETTER (OUTPATIENT)
Age: 49
End: 2019-11-07

## 2020-02-17 ENCOUNTER — HEALTH MAINTENANCE LETTER (OUTPATIENT)
Age: 50
End: 2020-02-17

## 2020-03-12 ENCOUNTER — OFFICE VISIT (OUTPATIENT)
Dept: FAMILY MEDICINE | Facility: CLINIC | Age: 50
End: 2020-03-12
Payer: COMMERCIAL

## 2020-03-12 ENCOUNTER — TELEPHONE (OUTPATIENT)
Dept: FAMILY MEDICINE | Facility: CLINIC | Age: 50
End: 2020-03-12

## 2020-03-12 VITALS
HEIGHT: 66 IN | RESPIRATION RATE: 14 BRPM | OXYGEN SATURATION: 99 % | TEMPERATURE: 98.6 F | WEIGHT: 133 LBS | HEART RATE: 102 BPM | DIASTOLIC BLOOD PRESSURE: 80 MMHG | SYSTOLIC BLOOD PRESSURE: 132 MMHG | BODY MASS INDEX: 21.38 KG/M2

## 2020-03-12 DIAGNOSIS — J01.00 ACUTE NON-RECURRENT MAXILLARY SINUSITIS: ICD-10-CM

## 2020-03-12 DIAGNOSIS — M77.11 RIGHT LATERAL EPICONDYLITIS: ICD-10-CM

## 2020-03-12 DIAGNOSIS — J40 BRONCHITIS: Primary | ICD-10-CM

## 2020-03-12 PROCEDURE — 99214 OFFICE O/P EST MOD 30 MIN: CPT | Performed by: FAMILY MEDICINE

## 2020-03-12 RX ORDER — AZITHROMYCIN 250 MG/1
TABLET, FILM COATED ORAL
Qty: 6 TABLET | Refills: 0 | Status: SHIPPED | OUTPATIENT
Start: 2020-03-12 | End: 2023-02-09

## 2020-03-12 RX ORDER — PREDNISONE 20 MG/1
40 TABLET ORAL DAILY
Qty: 10 TABLET | Refills: 0 | Status: SHIPPED | OUTPATIENT
Start: 2020-03-12 | End: 2020-03-17

## 2020-03-12 ASSESSMENT — MIFFLIN-ST. JEOR: SCORE: 1246.06

## 2020-03-12 NOTE — PROGRESS NOTES
"Subjective     Rajwinder Lagos is a 49 year old female who presents to clinic today for the following health issues:    HPI   RESPIRATORY SYMPTOMS      Duration: Started Sunday    Description  nasal congestion, sore throat and facial pain/pressure    Severity: moderate    Accompanying signs and symptoms: See above    History (predisposing factors):  none    Precipitating or alleviating factors: None    Therapies tried and outcome:  rest and fluids guaifenesin Nyquil    Right elbow pain, intermittent, some difficulty extending; ongoing for past two months.  Thinks it's an overuse injury.  Works on the computer a lot.    Father came back from [place] last week and developed bronchitis; got prescribed antibiotic and is not fully recovered.  Pt started to develop symptoms of a cough on Sunday ( developed cough on Saturday).  They are both sick with a cough but no fever.  No recent travel.    Reviewed and updated as needed this visit by Provider  Tobacco  Allergies  Meds  Problems  Med Hx  Surg Hx  Fam Hx         Review of Systems   C: NEGATIVE for fever, chills, change in weight  I: NEGATIVE for worrisome rashes, moles or lesions  E: NEGATIVE for vision changes or irritation  CV: NEGATIVE for chest pain, palpitations or peripheral edema  GI: NEGATIVE for nausea, abdominal pain, heartburn, or change in bowel habits  H: NEGATIVE for bleeding problems        Objective    /80 (Patient Position: Sitting, Cuff Size: Adult Regular)   Pulse 102   Temp 98.6  F (37  C) (Tympanic)   Resp 14   Ht 1.67 m (5' 5.75\")   Wt 60.3 kg (133 lb)   LMP 02/23/2020 (Exact Date)   SpO2 99%   Breastfeeding No   BMI 21.63 kg/m    Body mass index is 21.63 kg/m .  Physical Exam   GENERAL: alert and no distress  EYES: Eyes grossly normal to inspection, PERRL and conjunctivae and sclerae normal  HENT: ear canals and TM's normal, mouth without ulcers or lesions.  +b/l boggy turbinates, no active nasal drainage.  " +sinus tenderness (worse on left side of face)  NECK: no adenopathy, no asymmetry, masses, or scars and thyroid normal to palpation  RESP: lungs clear to auscultation - no rales, rhonchi or wheezes  CV: regular rate and rhythm, normal S1 S2, no S3 or S4, no murmur, click or rub, no peripheral edema and peripheral pulses strong  MSK: right elbow pain/tenderness at the lateral epicondyle.  ROM and strength normal in upper extremities.  SKIN: no suspicious lesions or rashes      Diagnostic Test Results:  Labs reviewed in Epic        Assessment & Plan   Problem List Items Addressed This Visit     Right lateral epicondylitis    Relevant Medications    predniSONE (DELTASONE) 20 MG tablet      Other Visit Diagnoses     Bronchitis    -  Primary    Relevant Medications    azithromycin (ZITHROMAX) 250 MG tablet    predniSONE (DELTASONE) 20 MG tablet    Acute non-recurrent maxillary sinusitis        Relevant Medications    azithromycin (ZITHROMAX) 250 MG tablet    predniSONE (DELTASONE) 20 MG tablet         Bronchitis with sinusitis: new, worsening  Rx Azithromycin  Add Prednisone taper for the severely congested sinuses.    --push fluids, rest, and symptomatic treatment as needed:  Mucinex 600 mg 2 tabs bid  Increase humidity to 30-40% in bedroom at night - vaporizer  Saline nasal spray as needed  Benadryl 25mg 1/2 - 1 hour before bed time  Maintain 8 hr minimum of sleep at night  Robitussin for cough  --See Patient Instructions for details and follow-up instructions    Right lateral epicondylitis x 2 months: new, worsening  --Prescribed Prednisone for her severe sinusitis; this should also help her epicondylitis  --Discussed exercises that she should do daily, and advised her to wear a brace   --Avoid repetitive motions  --See PT if no improvement      See Patient Instructions  Return in about 1 week (around 3/19/2020) for re-check / follow-up.    Cindy Lowry, DO  [facility]

## 2020-03-12 NOTE — PATIENT INSTRUCTIONS
Patient Education     Understanding Lateral Epicondylitis    Tendons are strong bands of tissue that connect muscles to bones. Lateral epicondylitis affects the tendons that connect muscles in the forearm to the lateral epicondyle. This is the bony knob on the outer side of the elbow. The condition occurs if the extensor tendons of the wrist become red and swollen (irritated). This can cause pain in the elbow, forearm, and wrist. Because the condition is sometimes caused by playing tennis, it is also known as  tennis elbow.   How to say it  LA-tuhr-lolly [id number]-RET-duh-LY-tis   What causes lateral epicondylitis?  The condition most often occurs because of overuse. This can be from any activity that repeatedly puts stress on the forearm extensor muscles or tendons and wrist. For instance, playing tennis, lifting weights, cutting meat, painting, and typing can all cause the condition. Wear and tear of the tendons from aging or an injury to the tendons can also cause the condition.  Symptoms of lateral epicondylitis  The most common symptom is pain. You may feel it on the outer side of the elbow and down the back of the forearm. It may be worse when moving or using the elbow, forearm, or wrist. You may also feel pain when gripping or lifting things.  Treatment for lateral epicondylitis  Treatments may include:    Resting the elbow, forearm, and wrist. You ll need to avoid movements that can make your symptoms worse. You also may need to avoid certain sports and types of work for a time. This helps relieve symptoms and prevent further damage to the tendons.    Changing the action that caused the problem. For instance, if the tendons were damaged from playing tennis, it may help to change your playing technique or use different equipment. This helps prevent further damage to the tendons.    Using cold packs. Putting an ice pack on the injured area can help reduce pain and swelling.    Taking pain medicines. Taking  prescription or over-the-counter pain medicines may help reduce pain and swelling.      Wearing a brace. This helps reduce strain on the muscles and tendons in the forearm, which may relieve symptoms. It is very important to wear the brace properly.    Doing exercises and physical therapy. These help improve strength and range of motion in the elbow, forearm, and wrist.    Getting shots of medicine into the injured area. These may help relieve symptoms for a time.    Having surgery. This may be an option if other treatments fail to relieve symptoms. In many cases, the surgeon removes the damaged tissue.  Possible complications of lateral epicondylitis  If the tendons involved don t heal properly, symptoms may return or get worse. To help prevent this, follow your treatment plan as directed.  When to call your healthcare provider  Call your healthcare provider right away if you have any of these:    Fever of 100.4 F (38 C) or higher, or as directed    Redness, swelling, or warmth in the elbow or forearm that gets worse    Symptoms that don t get better with treatment, or get worse    New symptoms   Date Last Reviewed: 3/10/2016    [id number] [employer]. [address] [place] [address], [place] [address]. All rights reserved. This information is not intended as a substitute for professional medical care. Always follow your healthcare professional's instructions.

## 2020-03-12 NOTE — TELEPHONE ENCOUNTER
Pt and  are scheduled today. Triage called to verify review travel screening and symptoms. Pt's  reports pt has a cough since this weekend and nasal congestion. No fever,chest pain or wheezing. Pt has not traveled or is aware of exposure to covid13. Her father was in [place] and was treated with abx for a cough. They have taken mucinex and want to come in to assess symptoms and find if she needs abx or something other than OTC medication for cough. Per  it would be hard to complete e-visit.

## 2020-03-13 PROBLEM — M77.11 RIGHT LATERAL EPICONDYLITIS: Status: ACTIVE | Noted: 2020-03-13

## 2020-11-29 ENCOUNTER — HEALTH MAINTENANCE LETTER (OUTPATIENT)
Age: 50
End: 2020-11-29

## 2021-02-14 ENCOUNTER — HEALTH MAINTENANCE LETTER (OUTPATIENT)
Age: 51
End: 2021-02-14

## 2021-04-07 ENCOUNTER — IMMUNIZATION (OUTPATIENT)
Dept: NURSING | Facility: CLINIC | Age: 51
End: 2021-04-07
Payer: COMMERCIAL

## 2021-04-07 PROCEDURE — 91300 PR COVID VAC PFIZER DIL RECON 30 MCG/0.3 ML IM: CPT

## 2021-04-07 PROCEDURE — 0001A PR COVID VAC PFIZER DIL RECON 30 MCG/0.3 ML IM: CPT

## 2021-04-28 ENCOUNTER — IMMUNIZATION (OUTPATIENT)
Dept: NURSING | Facility: CLINIC | Age: 51
End: 2021-04-28
Attending: INTERNAL MEDICINE
Payer: COMMERCIAL

## 2021-04-28 PROCEDURE — 0002A PR COVID VAC PFIZER DIL RECON 30 MCG/0.3 ML IM: CPT

## 2021-04-28 PROCEDURE — 91300 PR COVID VAC PFIZER DIL RECON 30 MCG/0.3 ML IM: CPT

## 2021-09-25 ENCOUNTER — HEALTH MAINTENANCE LETTER (OUTPATIENT)
Age: 51
End: 2021-09-25

## 2022-01-15 ENCOUNTER — HEALTH MAINTENANCE LETTER (OUTPATIENT)
Age: 52
End: 2022-01-15

## 2022-03-12 ENCOUNTER — HEALTH MAINTENANCE LETTER (OUTPATIENT)
Age: 52
End: 2022-03-12

## 2022-12-26 ENCOUNTER — HEALTH MAINTENANCE LETTER (OUTPATIENT)
Age: 52
End: 2022-12-26

## 2023-02-09 ENCOUNTER — OFFICE VISIT (OUTPATIENT)
Dept: URGENT CARE | Facility: URGENT CARE | Age: 53
End: 2023-02-09
Payer: COMMERCIAL

## 2023-02-09 VITALS
SYSTOLIC BLOOD PRESSURE: 143 MMHG | DIASTOLIC BLOOD PRESSURE: 90 MMHG | RESPIRATION RATE: 16 BRPM | OXYGEN SATURATION: 100 % | HEART RATE: 109 BPM | TEMPERATURE: 101.6 F

## 2023-02-09 DIAGNOSIS — R50.9 FEVER AND CHILLS: ICD-10-CM

## 2023-02-09 DIAGNOSIS — N10 ACUTE PYELONEPHRITIS: Primary | ICD-10-CM

## 2023-02-09 DIAGNOSIS — R30.0 DYSURIA: ICD-10-CM

## 2023-02-09 LAB
ALBUMIN UR-MCNC: 30 MG/DL
APPEARANCE UR: CLEAR
BACTERIA #/AREA URNS HPF: ABNORMAL /HPF
BILIRUB UR QL STRIP: NEGATIVE
COLOR UR AUTO: YELLOW
GLUCOSE UR STRIP-MCNC: NEGATIVE MG/DL
HGB UR QL STRIP: ABNORMAL
KETONES UR STRIP-MCNC: NEGATIVE MG/DL
LEUKOCYTE ESTERASE UR QL STRIP: ABNORMAL
NITRATE UR QL: NEGATIVE
PH UR STRIP: 6 [PH] (ref 5–7)
RBC #/AREA URNS AUTO: ABNORMAL /HPF
SP GR UR STRIP: 1.02 (ref 1–1.03)
SQUAMOUS #/AREA URNS AUTO: ABNORMAL /LPF
UROBILINOGEN UR STRIP-ACNC: 0.2 E.U./DL
WBC #/AREA URNS AUTO: ABNORMAL /HPF
WBC CLUMPS #/AREA URNS HPF: PRESENT /HPF

## 2023-02-09 PROCEDURE — 87086 URINE CULTURE/COLONY COUNT: CPT | Performed by: NURSE PRACTITIONER

## 2023-02-09 PROCEDURE — 81001 URINALYSIS AUTO W/SCOPE: CPT | Performed by: NURSE PRACTITIONER

## 2023-02-09 PROCEDURE — 87186 SC STD MICRODIL/AGAR DIL: CPT | Performed by: NURSE PRACTITIONER

## 2023-02-09 PROCEDURE — 99214 OFFICE O/P EST MOD 30 MIN: CPT | Performed by: NURSE PRACTITIONER

## 2023-02-09 RX ORDER — ACETAMINOPHEN 500 MG
1000 TABLET ORAL ONCE
Status: COMPLETED | OUTPATIENT
Start: 2023-02-09 | End: 2023-02-09

## 2023-02-09 RX ORDER — CIPROFLOXACIN 500 MG/1
500 TABLET, FILM COATED ORAL 2 TIMES DAILY
Qty: 14 TABLET | Refills: 0 | Status: SHIPPED | OUTPATIENT
Start: 2023-02-09 | End: 2023-02-16

## 2023-02-09 RX ADMIN — Medication 500 MG: at 18:18

## 2023-02-09 NOTE — PROGRESS NOTES
Chief Complaint   Patient presents with     Abdominal Pain     Pt is having abdominal pain and is having urinary frequency and dysuria X 3 days          ICD-10-CM    1. Acute pyelonephritis  N10 ciprofloxacin (CIPRO) 500 MG tablet     acetaminophen (TYLENOL) tablet 1,000 mg      2. Dysuria  R30.0 UA macro with reflex to Microscopic and Culture - Clinc Collect     Urine Microscopic Exam     Urine Culture      3. Fever and chills  R50.9 acetaminophen (TYLENOL) tablet 1,000 mg      Antibiotics as prescribed.  If symptoms worsen or fail to improve in 48 hours or she is unable to keep the medication down she is instructed to go to the emergency room immediately for further assessment.  Increase fluid intake, may take Azo as needed for discomfort.      Results for orders placed or performed in visit on 02/09/23 (from the past 24 hour(s))   UA macro with reflex to Microscopic and Culture - Clinc Collect    Specimen: Urine, Midstream   Result Value Ref Range    Color Urine Yellow Colorless, Straw, Light Yellow, Yellow    Appearance Urine Clear Clear    Glucose Urine Negative Negative mg/dL    Bilirubin Urine Negative Negative    Ketones Urine Negative Negative mg/dL    Specific Gravity Urine 1.020 1.003 - 1.035    Blood Urine Large (A) Negative    pH Urine 6.0 5.0 - 7.0    Protein Albumin Urine 30 (A) Negative mg/dL    Urobilinogen Urine 0.2 0.2, 1.0 E.U./dL    Nitrite Urine Negative Negative    Leukocyte Esterase Urine Large (A) Negative   Urine Microscopic Exam   Result Value Ref Range    Bacteria Urine Many (A) None Seen /HPF    RBC Urine 2-5 (A) 0-2 /HPF /HPF    WBC Urine  (A) 0-5 /HPF /HPF    Squamous Epithelials Urine Few (A) None Seen /LPF    WBC Clumps Urine Present (A) None Seen /HPF       Subjective     Rajwinder Lagos is an 52 year old female who presents to clinic today for 5-day history of some minor discomfort with urination then 2 days ago she developed back pain and some nausea.  Today she has  fever over 101 with some lower abdominal pain and increasing dysuria.       Objective    BP (!) 143/90   Pulse 109   Temp (!) 101.6  F (38.7  C) (Tympanic)   Resp 16   SpO2 100%   Nurses notes and VS have been reviewed.    Physical Exam       GENERAL APPEARANCE: healthy appearing, alert     ABDOMEN:  soft, nontender, no HSM or masses and bowel sounds normal, positive CVA tenderness on the left     MS: extremities normal- no gross deformities noted; normal muscle tone.     SKIN: no suspicious lesions or rashes     PSYCH: normal thought process; no significant mood disturbance    Patient Instructions   PREVENTION OF URINARY TRACT INFECTION    AVOID CHEMICAL IRRITTANTS: bath gels,  Perfumed products,  Deoderant pads or tampons, douching    CLOTHING that increases moisture and bacterial growth:  Nylon, lycra, pantihose, pantiliners     AVOID: tight clothing and thongs    ACIDIFY URINE: cranberry tablets instead of cranberry juice (with excess sugar) to acidify urine and decrease bacterial growth.     URINATE after intercourse    FLUIDS: 6-8 glasses water per day       ENEIDA Sauer, CNP  [facility] Provider    The use of Dragon/[employer] dictation services may have been used to construct the content in this note; any grammatical or spelling errors are non-intentional. Please contact the author of this note directly if you are in need of any clarification.

## 2023-02-12 LAB — BACTERIA UR CULT: ABNORMAL

## 2023-04-16 ENCOUNTER — HEALTH MAINTENANCE LETTER (OUTPATIENT)
Age: 53
End: 2023-04-16

## 2023-05-08 ENCOUNTER — OFFICE VISIT (OUTPATIENT)
Dept: URGENT CARE | Facility: URGENT CARE | Age: 53
End: 2023-05-08
Payer: COMMERCIAL

## 2023-05-08 VITALS
BODY MASS INDEX: 23.09 KG/M2 | TEMPERATURE: 98.3 F | DIASTOLIC BLOOD PRESSURE: 101 MMHG | HEART RATE: 83 BPM | RESPIRATION RATE: 22 BRPM | SYSTOLIC BLOOD PRESSURE: 169 MMHG | OXYGEN SATURATION: 100 % | WEIGHT: 142 LBS

## 2023-05-08 DIAGNOSIS — R05.9 COUGH, UNSPECIFIED TYPE: Primary | ICD-10-CM

## 2023-05-08 DIAGNOSIS — J30.2 SEASONAL ALLERGIC RHINITIS, UNSPECIFIED TRIGGER: ICD-10-CM

## 2023-05-08 DIAGNOSIS — R07.0 THROAT PAIN: ICD-10-CM

## 2023-05-08 PROCEDURE — 99213 OFFICE O/P EST LOW 20 MIN: CPT | Performed by: FAMILY MEDICINE

## 2023-05-08 RX ORDER — BENZONATATE 200 MG/1
200 CAPSULE ORAL 3 TIMES DAILY PRN
Qty: 21 CAPSULE | Refills: 0 | Status: SHIPPED | OUTPATIENT
Start: 2023-05-08 | End: 2023-05-15

## 2023-05-08 RX ORDER — AZITHROMYCIN 250 MG/1
TABLET, FILM COATED ORAL
Qty: 6 TABLET | Refills: 0 | Status: SHIPPED | OUTPATIENT
Start: 2023-05-08 | End: 2023-05-13

## 2023-05-08 NOTE — PROGRESS NOTES
SUBJECTIVE: Rajwinder Lagos is a 52 year old female presenting with a chief complaint of cough .  Onset of symptoms was 10 day(s) ago.  Course of illness is worsening.    Current and Associated symptoms: sore throat  Predisposing factors include None.    No past medical history on file.  No Known Allergies  Social History     Tobacco Use     Smoking status: Never     Smokeless tobacco: Never   Vaping Use     Vaping status: Not on file   Substance Use Topics     Alcohol use: No     Alcohol/week: 0.0 standard drinks of alcohol       ROS:  SKIN: no rash  GI: no vomiting    OBJECTIVE:  BP (!) 169/101   Pulse 83   Temp 98.3  F (36.8  C)   Resp 22   Wt 64.4 kg (142 lb)   LMP  (LMP Unknown)   SpO2 100%   BMI 23.09 kg/m  GENERAL APPEARANCE: healthy, alert and no distress  EYES: EOMI,  PERRL, conjunctiva clear  HENT: ear canals and TM's normal.  Nose and mouth without ulcers, erythema or lesions  RESP: lungs clear to auscultation - no rales, rhonchi or wheezes  SKIN: no suspicious lesions or rashes      ICD-10-CM    1. Cough, unspecified type  R05.9 azithromycin (ZITHROMAX) 250 MG tablet     benzonatate (TESSALON) 200 MG capsule      2. Seasonal allergic rhinitis, unspecified trigger  J30.2       3. Throat pain  R07.0           Fluids/Rest, f/u if worse/not any better

## 2024-06-23 ENCOUNTER — HEALTH MAINTENANCE LETTER (OUTPATIENT)
Age: 54
End: 2024-06-23

## 2024-11-07 ENCOUNTER — VIRTUAL VISIT (OUTPATIENT)
Dept: FAMILY MEDICINE | Facility: CLINIC | Age: 54
End: 2024-11-07
Payer: COMMERCIAL

## 2024-11-07 DIAGNOSIS — F40.243 FEAR OF FLYING: Primary | ICD-10-CM

## 2024-11-07 DIAGNOSIS — Z12.11 SCREEN FOR COLON CANCER: ICD-10-CM

## 2024-11-07 DIAGNOSIS — Z12.31 VISIT FOR SCREENING MAMMOGRAM: ICD-10-CM

## 2024-11-07 PROCEDURE — 99213 OFFICE O/P EST LOW 20 MIN: CPT | Mod: 95 | Performed by: NURSE PRACTITIONER

## 2024-11-07 RX ORDER — LORAZEPAM 0.5 MG/1
.25-.5 TABLET ORAL 2 TIMES DAILY PRN
Qty: 10 TABLET | Refills: 0 | Status: SHIPPED | OUTPATIENT
Start: 2024-11-07

## 2024-11-07 NOTE — PROGRESS NOTES
Rajwinder is a 53 year old who is being evaluated via a billable video visit.    How would you like to obtain your AVS? MyChart  If the video visit is dropped, the invitation should be resent by: Text to cell phone: [phone]  Will anyone else be joining your video visit? No      Assessment & Plan     Fear of flying  Nervous to fly internationally as she gets panic. Sent with lorazepam.   - LORazepam (ATIVAN) 0.5 MG tablet; Take 0.5-1 tablets (0.25-0.5 mg) by mouth 2 times daily as needed for anxiety.    Visit for screening mammogram  Due for screening   - MA Screening Bilateral w/ Ori; Future    Screen for colon cancer  Due for screening   - Colonoscopy Screening  Referral; Future          Subjective   Rajwinder is a 53 year old, presenting for the following health issues:  Medication Request  0  History of Present Illness       Reason for visit:  Medication for traveling on Sunday   She is taking medications regularly.       Has claustrophobia, more when she is warm. Gets hot and then difficulty breathing.  Happens in a car, more when it is hot   Can get it in the elevator also   Flying international soon and is nervous        Review of Systems  Detailed as above         Objective    Vitals - Patient Reported  Pain Score: No Pain (0)        Physical Exam   GENERAL: alert and no distress  EYES: Eyes grossly normal to inspection.  No discharge or erythema, or obvious scleral/conjunctival abnormalities.  RESP: No audible wheeze, cough, or visible cyanosis.    SKIN: Visible skin clear. No significant rash, abnormal pigmentation or lesions.  NEURO: Cranial nerves grossly intact.  Mentation and speech appropriate for age.  PSYCH: Appropriate affect, tone, and pace of words          Video-Visit Details    Type of service:  Video Visit   Originating Location (pt. Location): Home    Distant Location (provider location):  On-site  Platform used for Video Visit: Sreedhar  Signed Electronically by: Markos Man  ENEIDA Navarro CNP

## 2024-11-08 ENCOUNTER — PATIENT OUTREACH (OUTPATIENT)
Dept: CARE COORDINATION | Facility: CLINIC | Age: 54
End: 2024-11-08
Payer: COMMERCIAL

## 2025-04-19 ENCOUNTER — HEALTH MAINTENANCE LETTER (OUTPATIENT)
Age: 55
End: 2025-04-19

## 2025-07-12 ENCOUNTER — HEALTH MAINTENANCE LETTER (OUTPATIENT)
Age: 55
End: 2025-07-12